# Patient Record
Sex: MALE | Race: WHITE | NOT HISPANIC OR LATINO
[De-identification: names, ages, dates, MRNs, and addresses within clinical notes are randomized per-mention and may not be internally consistent; named-entity substitution may affect disease eponyms.]

---

## 2022-01-03 ENCOUNTER — NON-APPOINTMENT (OUTPATIENT)
Age: 77
End: 2022-01-03

## 2022-01-03 ENCOUNTER — OUTPATIENT (OUTPATIENT)
Dept: OUTPATIENT SERVICES | Facility: HOSPITAL | Age: 77
LOS: 1 days | End: 2022-01-03

## 2022-01-03 DIAGNOSIS — Z11.52 ENCOUNTER FOR SCREENING FOR COVID-19: ICD-10-CM

## 2022-01-03 DIAGNOSIS — Z90.89 ACQUIRED ABSENCE OF OTHER ORGANS: Chronic | ICD-10-CM

## 2022-01-03 LAB — SARS-COV-2 RNA SPEC QL NAA+PROBE: SIGNIFICANT CHANGE UP

## 2022-01-05 ENCOUNTER — INPATIENT (INPATIENT)
Facility: HOSPITAL | Age: 77
LOS: 5 days | Discharge: HOME | DRG: 233 | End: 2022-01-11
Attending: THORACIC SURGERY (CARDIOTHORACIC VASCULAR SURGERY) | Admitting: INTERNAL MEDICINE
Payer: MEDICARE

## 2022-01-05 VITALS
RESPIRATION RATE: 16 BRPM | HEART RATE: 110 BPM | DIASTOLIC BLOOD PRESSURE: 81 MMHG | WEIGHT: 199.96 LBS | TEMPERATURE: 98 F | OXYGEN SATURATION: 98 % | SYSTOLIC BLOOD PRESSURE: 155 MMHG | HEIGHT: 72 IN

## 2022-01-05 DIAGNOSIS — R94.39 ABNORMAL RESULT OF OTHER CARDIOVASCULAR FUNCTION STUDY: ICD-10-CM

## 2022-01-05 DIAGNOSIS — Z90.89 ACQUIRED ABSENCE OF OTHER ORGANS: Chronic | ICD-10-CM

## 2022-01-05 LAB
A1C WITH ESTIMATED AVERAGE GLUCOSE RESULT: 6.5 % — HIGH (ref 4–5.6)
ALBUMIN SERPL ELPH-MCNC: 4.1 G/DL — SIGNIFICANT CHANGE UP (ref 3.3–5)
ALP SERPL-CCNC: 61 U/L — SIGNIFICANT CHANGE UP (ref 40–120)
ALT FLD-CCNC: 26 U/L — SIGNIFICANT CHANGE UP (ref 10–45)
ANION GAP SERPL CALC-SCNC: 13 MMOL/L — SIGNIFICANT CHANGE UP (ref 5–17)
ANION GAP SERPL CALC-SCNC: 13 MMOL/L — SIGNIFICANT CHANGE UP (ref 5–17)
APPEARANCE UR: CLEAR — SIGNIFICANT CHANGE UP
APTT BLD: 32.4 SEC — SIGNIFICANT CHANGE UP (ref 27.5–35.5)
AST SERPL-CCNC: 20 U/L — SIGNIFICANT CHANGE UP (ref 10–40)
BACTERIA # UR AUTO: NEGATIVE — SIGNIFICANT CHANGE UP
BASOPHILS # BLD AUTO: 0.02 K/UL — SIGNIFICANT CHANGE UP (ref 0–0.2)
BASOPHILS NFR BLD AUTO: 0.3 % — SIGNIFICANT CHANGE UP (ref 0–2)
BILIRUB SERPL-MCNC: 0.6 MG/DL — SIGNIFICANT CHANGE UP (ref 0.2–1.2)
BILIRUB UR-MCNC: NEGATIVE — SIGNIFICANT CHANGE UP
BLD GP AB SCN SERPL QL: NEGATIVE — SIGNIFICANT CHANGE UP
BUN SERPL-MCNC: 36 MG/DL — HIGH (ref 7–23)
BUN SERPL-MCNC: 39 MG/DL — HIGH (ref 7–23)
CALCIUM SERPL-MCNC: 10.5 MG/DL — SIGNIFICANT CHANGE UP (ref 8.4–10.5)
CALCIUM SERPL-MCNC: 9.8 MG/DL — SIGNIFICANT CHANGE UP (ref 8.4–10.5)
CHLORIDE SERPL-SCNC: 103 MMOL/L — SIGNIFICANT CHANGE UP (ref 96–108)
CHLORIDE SERPL-SCNC: 104 MMOL/L — SIGNIFICANT CHANGE UP (ref 96–108)
CO2 SERPL-SCNC: 22 MMOL/L — SIGNIFICANT CHANGE UP (ref 22–31)
CO2 SERPL-SCNC: 25 MMOL/L — SIGNIFICANT CHANGE UP (ref 22–31)
COLOR SPEC: SIGNIFICANT CHANGE UP
CREAT SERPL-MCNC: 1.23 MG/DL — SIGNIFICANT CHANGE UP (ref 0.5–1.3)
CREAT SERPL-MCNC: 1.38 MG/DL — HIGH (ref 0.5–1.3)
DIFF PNL FLD: NEGATIVE — SIGNIFICANT CHANGE UP
EOSINOPHIL # BLD AUTO: 0.04 K/UL — SIGNIFICANT CHANGE UP (ref 0–0.5)
EOSINOPHIL NFR BLD AUTO: 0.5 % — SIGNIFICANT CHANGE UP (ref 0–6)
ESTIMATED AVERAGE GLUCOSE: 140 MG/DL — HIGH (ref 68–114)
GLUCOSE SERPL-MCNC: 163 MG/DL — HIGH (ref 70–99)
GLUCOSE SERPL-MCNC: 182 MG/DL — HIGH (ref 70–99)
GLUCOSE UR QL: ABNORMAL
HCT VFR BLD CALC: 38.5 % — LOW (ref 39–50)
HCT VFR BLD CALC: 43.5 % — SIGNIFICANT CHANGE UP (ref 39–50)
HGB BLD-MCNC: 12.5 G/DL — LOW (ref 13–17)
HGB BLD-MCNC: 14.1 G/DL — SIGNIFICANT CHANGE UP (ref 13–17)
IMM GRANULOCYTES NFR BLD AUTO: 0.4 % — SIGNIFICANT CHANGE UP (ref 0–1.5)
INR BLD: 1.04 RATIO — SIGNIFICANT CHANGE UP (ref 0.88–1.16)
KETONES UR-MCNC: NEGATIVE — SIGNIFICANT CHANGE UP
LEUKOCYTE ESTERASE UR-ACNC: ABNORMAL
LYMPHOCYTES # BLD AUTO: 1.08 K/UL — SIGNIFICANT CHANGE UP (ref 1–3.3)
LYMPHOCYTES # BLD AUTO: 14.5 % — SIGNIFICANT CHANGE UP (ref 13–44)
MCHC RBC-ENTMCNC: 27.7 PG — SIGNIFICANT CHANGE UP (ref 27–34)
MCHC RBC-ENTMCNC: 28.3 PG — SIGNIFICANT CHANGE UP (ref 27–34)
MCHC RBC-ENTMCNC: 32.4 GM/DL — SIGNIFICANT CHANGE UP (ref 32–36)
MCHC RBC-ENTMCNC: 32.5 GM/DL — SIGNIFICANT CHANGE UP (ref 32–36)
MCV RBC AUTO: 85.4 FL — SIGNIFICANT CHANGE UP (ref 80–100)
MCV RBC AUTO: 87.2 FL — SIGNIFICANT CHANGE UP (ref 80–100)
MONOCYTES # BLD AUTO: 0.46 K/UL — SIGNIFICANT CHANGE UP (ref 0–0.9)
MONOCYTES NFR BLD AUTO: 6.2 % — SIGNIFICANT CHANGE UP (ref 2–14)
NEUTROPHILS # BLD AUTO: 5.83 K/UL — SIGNIFICANT CHANGE UP (ref 1.8–7.4)
NEUTROPHILS NFR BLD AUTO: 78.1 % — HIGH (ref 43–77)
NITRITE UR-MCNC: NEGATIVE — SIGNIFICANT CHANGE UP
NRBC # BLD: 0 /100 WBCS — SIGNIFICANT CHANGE UP (ref 0–0)
NRBC # BLD: 0 /100 WBCS — SIGNIFICANT CHANGE UP (ref 0–0)
NT-PROBNP SERPL-SCNC: 19 PG/ML — SIGNIFICANT CHANGE UP (ref 0–300)
PA ADP PRP-ACNC: 229 PRU — SIGNIFICANT CHANGE UP (ref 194–417)
PH UR: 6 — SIGNIFICANT CHANGE UP (ref 5–8)
PLATELET # BLD AUTO: 255 K/UL — SIGNIFICANT CHANGE UP (ref 150–400)
PLATELET # BLD AUTO: 295 K/UL — SIGNIFICANT CHANGE UP (ref 150–400)
POTASSIUM SERPL-MCNC: 4.4 MMOL/L — SIGNIFICANT CHANGE UP (ref 3.5–5.3)
POTASSIUM SERPL-MCNC: 4.6 MMOL/L — SIGNIFICANT CHANGE UP (ref 3.5–5.3)
POTASSIUM SERPL-SCNC: 4.4 MMOL/L — SIGNIFICANT CHANGE UP (ref 3.5–5.3)
POTASSIUM SERPL-SCNC: 4.6 MMOL/L — SIGNIFICANT CHANGE UP (ref 3.5–5.3)
PREALB SERPL-MCNC: 26 MG/DL — SIGNIFICANT CHANGE UP (ref 20–40)
PROT SERPL-MCNC: 7 G/DL — SIGNIFICANT CHANGE UP (ref 6–8.3)
PROT UR-MCNC: NEGATIVE — SIGNIFICANT CHANGE UP
PROTHROM AB SERPL-ACNC: 12.5 SEC — SIGNIFICANT CHANGE UP (ref 10.6–13.6)
RBC # BLD: 4.51 M/UL — SIGNIFICANT CHANGE UP (ref 4.2–5.8)
RBC # BLD: 4.99 M/UL — SIGNIFICANT CHANGE UP (ref 4.2–5.8)
RBC # FLD: 15.2 % — HIGH (ref 10.3–14.5)
RBC # FLD: 15.3 % — HIGH (ref 10.3–14.5)
RBC CASTS # UR COMP ASSIST: 1 /HPF — SIGNIFICANT CHANGE UP (ref 0–4)
RH IG SCN BLD-IMP: NEGATIVE — SIGNIFICANT CHANGE UP
RH IG SCN BLD-IMP: NEGATIVE — SIGNIFICANT CHANGE UP
SARS-COV-2 RNA SPEC QL NAA+PROBE: SIGNIFICANT CHANGE UP
SODIUM SERPL-SCNC: 139 MMOL/L — SIGNIFICANT CHANGE UP (ref 135–145)
SODIUM SERPL-SCNC: 141 MMOL/L — SIGNIFICANT CHANGE UP (ref 135–145)
SP GR SPEC: 1.03 — HIGH (ref 1.01–1.02)
TSH SERPL-MCNC: 0.85 UIU/ML — SIGNIFICANT CHANGE UP (ref 0.27–4.2)
UROBILINOGEN FLD QL: NEGATIVE — SIGNIFICANT CHANGE UP
WBC # BLD: 7.3 K/UL — SIGNIFICANT CHANGE UP (ref 3.8–10.5)
WBC # BLD: 7.46 K/UL — SIGNIFICANT CHANGE UP (ref 3.8–10.5)
WBC # FLD AUTO: 7.3 K/UL — SIGNIFICANT CHANGE UP (ref 3.8–10.5)
WBC # FLD AUTO: 7.46 K/UL — SIGNIFICANT CHANGE UP (ref 3.8–10.5)
WBC UR QL: 2 /HPF — SIGNIFICANT CHANGE UP (ref 0–5)

## 2022-01-05 PROCEDURE — 93010 ELECTROCARDIOGRAM REPORT: CPT

## 2022-01-05 PROCEDURE — 99221 1ST HOSP IP/OBS SF/LOW 40: CPT | Mod: 25

## 2022-01-05 PROCEDURE — 71045 X-RAY EXAM CHEST 1 VIEW: CPT | Mod: 26

## 2022-01-05 PROCEDURE — 93306 TTE W/DOPPLER COMPLETE: CPT | Mod: 26

## 2022-01-05 RX ORDER — CHLORHEXIDINE GLUCONATE 213 G/1000ML
1 SOLUTION TOPICAL ONCE
Refills: 0 | Status: COMPLETED | OUTPATIENT
Start: 2022-01-05 | End: 2022-01-05

## 2022-01-05 RX ORDER — CHLORHEXIDINE GLUCONATE 213 G/1000ML
10 SOLUTION TOPICAL ONCE
Refills: 0 | Status: COMPLETED | OUTPATIENT
Start: 2022-01-05 | End: 2022-01-06

## 2022-01-05 RX ORDER — SODIUM CHLORIDE 9 MG/ML
250 INJECTION INTRAMUSCULAR; INTRAVENOUS; SUBCUTANEOUS ONCE
Refills: 0 | Status: DISCONTINUED | OUTPATIENT
Start: 2022-01-05 | End: 2022-01-05

## 2022-01-05 RX ORDER — MUPIROCIN 20 MG/G
1 OINTMENT TOPICAL EVERY 12 HOURS
Refills: 0 | Status: DISCONTINUED | OUTPATIENT
Start: 2022-01-05 | End: 2022-01-06

## 2022-01-05 RX ORDER — HEPARIN SODIUM 5000 [USP'U]/ML
1000 INJECTION INTRAVENOUS; SUBCUTANEOUS
Qty: 25000 | Refills: 0 | Status: DISCONTINUED | OUTPATIENT
Start: 2022-01-05 | End: 2022-01-06

## 2022-01-05 RX ORDER — CEFUROXIME AXETIL 250 MG
1500 TABLET ORAL ONCE
Refills: 0 | Status: COMPLETED | OUTPATIENT
Start: 2022-01-05 | End: 2022-01-06

## 2022-01-05 RX ORDER — PANTOPRAZOLE SODIUM 20 MG/1
40 TABLET, DELAYED RELEASE ORAL
Refills: 0 | Status: DISCONTINUED | OUTPATIENT
Start: 2022-01-05 | End: 2022-01-06

## 2022-01-05 RX ORDER — METOPROLOL TARTRATE 50 MG
25 TABLET ORAL EVERY 8 HOURS
Refills: 0 | Status: DISCONTINUED | OUTPATIENT
Start: 2022-01-05 | End: 2022-01-06

## 2022-01-05 RX ORDER — METOPROLOL TARTRATE 50 MG
25 TABLET ORAL
Refills: 0 | Status: DISCONTINUED | OUTPATIENT
Start: 2022-01-05 | End: 2022-01-05

## 2022-01-05 RX ORDER — ATORVASTATIN CALCIUM 80 MG/1
80 TABLET, FILM COATED ORAL AT BEDTIME
Refills: 0 | Status: DISCONTINUED | OUTPATIENT
Start: 2022-01-05 | End: 2022-01-06

## 2022-01-05 RX ORDER — SODIUM CHLORIDE 9 MG/ML
3 INJECTION INTRAMUSCULAR; INTRAVENOUS; SUBCUTANEOUS EVERY 8 HOURS
Refills: 0 | Status: DISCONTINUED | OUTPATIENT
Start: 2022-01-05 | End: 2022-01-06

## 2022-01-05 RX ADMIN — HEPARIN SODIUM 10 UNIT(S)/HR: 5000 INJECTION INTRAVENOUS; SUBCUTANEOUS at 18:16

## 2022-01-05 RX ADMIN — MUPIROCIN 1 APPLICATION(S): 20 OINTMENT TOPICAL at 17:44

## 2022-01-05 RX ADMIN — SODIUM CHLORIDE 3 MILLILITER(S): 9 INJECTION INTRAMUSCULAR; INTRAVENOUS; SUBCUTANEOUS at 22:04

## 2022-01-05 RX ADMIN — PANTOPRAZOLE SODIUM 40 MILLIGRAM(S): 20 TABLET, DELAYED RELEASE ORAL at 16:18

## 2022-01-05 RX ADMIN — SODIUM CHLORIDE 3 MILLILITER(S): 9 INJECTION INTRAMUSCULAR; INTRAVENOUS; SUBCUTANEOUS at 14:00

## 2022-01-05 RX ADMIN — CHLORHEXIDINE GLUCONATE 1 APPLICATION(S): 213 SOLUTION TOPICAL at 22:24

## 2022-01-05 RX ADMIN — Medication 25 MILLIGRAM(S): at 16:18

## 2022-01-05 RX ADMIN — Medication 25 MILLIGRAM(S): at 22:19

## 2022-01-05 NOTE — CONSULT NOTE ADULT - ASSESSMENT
a/p  76 yr old male with PMH of HTN, HLD, Type 2 DM, CAD, former smoker presented to Dr. Augusto Cordero for follow up and underwent stress test which showed inferior, inferolateral, and septal ischemia, EF 60%. Patient presents today for further ischemic workup.    #Abnormal stress  -s/p LHC with distal LM 99%, mid RCA 99%--IABP inserted  -Ct sx eval w Dr. Mulligan    -AC per ctsx     #HTN   -start low dose bb    #ROSELIA  -cont to monitor  -hold outpt arb/hctz    dvt ppx  a/p  76 yr old male with PMH of HTN, HLD, Type 2 DM, CAD, former smoker presented to Dr. Augusto Cordero for follow up and underwent stress test which showed inferior, inferolateral, and septal ischemia, EF 60%. Patient presents today for further ischemic workup.    #Abnormal stress/ L Main Disease   -s/p LHC with distal LM 99%, mid RCA 99%--IABP inserted  -Ct sx eval w Dr. Mulligan    -AC per ctsx     #HTN   -start low dose bb    #ROSELIA  -cont to monitor  -hold outpt arb/hctz    dvt ppx  a/p  76 yr old male with PMH of HTN, HLD, Type 2 DM, CAD, former smoker presented to Dr. Augusto Cordero for follow up and underwent stress test which showed inferior, inferolateral, and septal ischemia, EF 60%. Patient presents today for further ischemic workup.    #Abnormal stress/ L Main Disease   -s/p LHC with distal LM 99%, mid RCA 99%--IABP inserted  -Ct sx eval w Dr. Mulligan for bypass  -AC per ctsx     #HTN   -start low dose bb    #ROSELIA  -cont to monitor  -hold outpt arb/hctz    dvt ppx  a/p  76 yr old male with PMH of HTN, HLD, Type 2 DM, CAD, former smoker presented to Dr. Augusto Cordero for follow up and underwent stress test which showed inferior, inferolateral, and septal ischemia, EF 60%. Patient presents today for further ischemic workup.    #CAD  -recent outpt abnl stress   -s/p LHC with distal LM 99%, mid RCA 99%--IABP inserted  -Ct sx eval w Dr. Mulligan for bypass  -AC per ctsx     #HTN   -start low dose bb    #ROSELIA  -cont to monitor  -hold outpt arb/hctz    dvt ppx

## 2022-01-05 NOTE — H&P CARDIOLOGY - NSICDXPASTMEDICALHX_GEN_ALL_CORE_FT
PAST MEDICAL HISTORY:  CAD (coronary artery disease)     Diabetes     HTN (hypertension)     Hyperlipidemia

## 2022-01-05 NOTE — CONSULT NOTE ADULT - ASSESSMENT
a/p  76 yr old male with PMH of HTN, HLD, Type 2 DM, CAD, former smoker presented to Dr. Augusto Cordero for follow up and underwent stress test which showed inferior, inferolateral, and septal ischemia, EF 60%. Patient presents today for further ischemic workup.  LHC showed Left main disease, now consulted for CT Surgery evaluation    #CAD  -recent outpt abnl stress   -s/p LHC with distal LM 99%, mid RCA 99%--IABP inserted  -Ct sx eval w Dr. Mulligan for bypass  -Plan for OR tomorrow with IABP, with Dr. Mulligan    #HTN   -start low dose bb    #ROSELIA  -cont to monitor  -hold outpt arb/hctz    dvt ppx

## 2022-01-05 NOTE — CONSULT NOTE ADULT - TIME BILLING
Pt seen and examined, agree with the above assessment and plan by BROWN Guillen.  Pt s/p cardiac cath today for recent abnl outpt exercise stress echo  Cath revealing severe distal left main and prox RCA disease  Pt s/p IABP placement for support  Scheduled for CABG tomorrow w Dr veloz  Continue current excellent care per CTU

## 2022-01-05 NOTE — ASU PATIENT PROFILE, ADULT - FALL HARM RISK - UNIVERSAL INTERVENTIONS
Bed in lowest position, wheels locked, appropriate side rails in place/Call bell, personal items and telephone in reach/Instruct patient to call for assistance before getting out of bed or chair/Non-slip footwear when patient is out of bed/[place] to call system/Physically safe environment - no spills, clutter or unnecessary equipment/Purposeful Proactive Rounding/Room/bathroom lighting operational, light cord in reach

## 2022-01-06 ENCOUNTER — APPOINTMENT (OUTPATIENT)
Dept: CARDIOTHORACIC SURGERY | Facility: CLINIC | Age: 77
End: 2022-01-06

## 2022-01-06 PROBLEM — I25.10 ATHEROSCLEROTIC HEART DISEASE OF NATIVE CORONARY ARTERY WITHOUT ANGINA PECTORIS: Chronic | Status: ACTIVE | Noted: 2022-01-05

## 2022-01-06 LAB
ALBUMIN SERPL ELPH-MCNC: 3.5 G/DL — SIGNIFICANT CHANGE UP (ref 3.3–5)
ALBUMIN SERPL ELPH-MCNC: 3.9 G/DL — SIGNIFICANT CHANGE UP (ref 3.3–5)
ALP SERPL-CCNC: 46 U/L — SIGNIFICANT CHANGE UP (ref 40–120)
ALP SERPL-CCNC: 61 U/L — SIGNIFICANT CHANGE UP (ref 40–120)
ALT FLD-CCNC: 23 U/L — SIGNIFICANT CHANGE UP (ref 10–45)
ALT FLD-CCNC: 25 U/L — SIGNIFICANT CHANGE UP (ref 10–45)
ANION GAP SERPL CALC-SCNC: 13 MMOL/L — SIGNIFICANT CHANGE UP (ref 5–17)
ANION GAP SERPL CALC-SCNC: 14 MMOL/L — SIGNIFICANT CHANGE UP (ref 5–17)
APTT BLD: 29.1 SEC — SIGNIFICANT CHANGE UP (ref 27.5–35.5)
APTT BLD: 40.2 SEC — HIGH (ref 27.5–35.5)
APTT BLD: >200 SEC — CRITICAL HIGH (ref 27.5–35.5)
AST SERPL-CCNC: 16 U/L — SIGNIFICANT CHANGE UP (ref 10–40)
AST SERPL-CCNC: 33 U/L — SIGNIFICANT CHANGE UP (ref 10–40)
BASE EXCESS BLDV CALC-SCNC: -0.6 MMOL/L — SIGNIFICANT CHANGE UP (ref -2–2)
BASE EXCESS BLDV CALC-SCNC: -1 MMOL/L — SIGNIFICANT CHANGE UP (ref -2–2)
BASE EXCESS BLDV CALC-SCNC: -1 MMOL/L — SIGNIFICANT CHANGE UP (ref -2–2)
BASE EXCESS BLDV CALC-SCNC: -1.2 MMOL/L — SIGNIFICANT CHANGE UP (ref -2–2)
BASOPHILS # BLD AUTO: 0.03 K/UL — SIGNIFICANT CHANGE UP (ref 0–0.2)
BASOPHILS # BLD AUTO: 0.04 K/UL — SIGNIFICANT CHANGE UP (ref 0–0.2)
BASOPHILS NFR BLD AUTO: 0.2 % — SIGNIFICANT CHANGE UP (ref 0–2)
BASOPHILS NFR BLD AUTO: 0.5 % — SIGNIFICANT CHANGE UP (ref 0–2)
BILIRUB SERPL-MCNC: 0.6 MG/DL — SIGNIFICANT CHANGE UP (ref 0.2–1.2)
BILIRUB SERPL-MCNC: 1.1 MG/DL — SIGNIFICANT CHANGE UP (ref 0.2–1.2)
BLOOD GAS VENOUS - CREATININE: SIGNIFICANT CHANGE UP MG/DL (ref 0.5–1.3)
BUN SERPL-MCNC: 24 MG/DL — HIGH (ref 7–23)
BUN SERPL-MCNC: 32 MG/DL — HIGH (ref 7–23)
CA-I SERPL-SCNC: 0.98 MMOL/L — LOW (ref 1.15–1.33)
CA-I SERPL-SCNC: 1.01 MMOL/L — LOW (ref 1.15–1.33)
CA-I SERPL-SCNC: 1.01 MMOL/L — LOW (ref 1.15–1.33)
CA-I SERPL-SCNC: 1.05 MMOL/L — LOW (ref 1.15–1.33)
CALCIUM SERPL-MCNC: 8.4 MG/DL — SIGNIFICANT CHANGE UP (ref 8.4–10.5)
CALCIUM SERPL-MCNC: 9.3 MG/DL — SIGNIFICANT CHANGE UP (ref 8.4–10.5)
CHLORIDE BLDV-SCNC: 102 MMOL/L — SIGNIFICANT CHANGE UP (ref 96–108)
CHLORIDE BLDV-SCNC: 102 MMOL/L — SIGNIFICANT CHANGE UP (ref 96–108)
CHLORIDE BLDV-SCNC: 103 MMOL/L — SIGNIFICANT CHANGE UP (ref 96–108)
CHLORIDE BLDV-SCNC: 103 MMOL/L — SIGNIFICANT CHANGE UP (ref 96–108)
CHLORIDE SERPL-SCNC: 100 MMOL/L — SIGNIFICANT CHANGE UP (ref 96–108)
CHLORIDE SERPL-SCNC: 102 MMOL/L — SIGNIFICANT CHANGE UP (ref 96–108)
CK MB BLD-MCNC: 5.8 % — HIGH (ref 0–3.5)
CK MB CFR SERPL CALC: 16.8 NG/ML — HIGH (ref 0–6.7)
CK SERPL-CCNC: 290 U/L — HIGH (ref 30–200)
CO2 BLDV-SCNC: 26 MMOL/L — SIGNIFICANT CHANGE UP (ref 22–26)
CO2 BLDV-SCNC: 27 MMOL/L — HIGH (ref 22–26)
CO2 SERPL-SCNC: 20 MMOL/L — LOW (ref 22–31)
CO2 SERPL-SCNC: 22 MMOL/L — SIGNIFICANT CHANGE UP (ref 22–31)
CREAT SERPL-MCNC: 1.1 MG/DL — SIGNIFICANT CHANGE UP (ref 0.5–1.3)
CREAT SERPL-MCNC: 1.12 MG/DL — SIGNIFICANT CHANGE UP (ref 0.5–1.3)
EOSINOPHIL # BLD AUTO: 0.06 K/UL — SIGNIFICANT CHANGE UP (ref 0–0.5)
EOSINOPHIL # BLD AUTO: 0.12 K/UL — SIGNIFICANT CHANGE UP (ref 0–0.5)
EOSINOPHIL NFR BLD AUTO: 0.5 % — SIGNIFICANT CHANGE UP (ref 0–6)
EOSINOPHIL NFR BLD AUTO: 1.5 % — SIGNIFICANT CHANGE UP (ref 0–6)
FIBRINOGEN PPP-MCNC: 381 MG/DL — SIGNIFICANT CHANGE UP (ref 290–520)
FIBRINOGEN PPP-MCNC: 453 MG/DL — SIGNIFICANT CHANGE UP (ref 290–520)
GAS PNL BLDA: SIGNIFICANT CHANGE UP
GAS PNL BLDV: 133 MMOL/L — LOW (ref 136–145)
GAS PNL BLDV: 134 MMOL/L — LOW (ref 136–145)
GAS PNL BLDV: 135 MMOL/L — LOW (ref 136–145)
GAS PNL BLDV: 135 MMOL/L — LOW (ref 136–145)
GAS PNL BLDV: SIGNIFICANT CHANGE UP
GLUCOSE BLDV-MCNC: 152 MG/DL — HIGH (ref 70–99)
GLUCOSE BLDV-MCNC: 168 MG/DL — HIGH (ref 70–99)
GLUCOSE BLDV-MCNC: 193 MG/DL — HIGH (ref 70–99)
GLUCOSE BLDV-MCNC: 198 MG/DL — HIGH (ref 70–99)
GLUCOSE SERPL-MCNC: 189 MG/DL — HIGH (ref 70–99)
GLUCOSE SERPL-MCNC: 242 MG/DL — HIGH (ref 70–99)
HCO3 BLDV-SCNC: 24 MMOL/L — SIGNIFICANT CHANGE UP (ref 22–29)
HCO3 BLDV-SCNC: 24 MMOL/L — SIGNIFICANT CHANGE UP (ref 22–29)
HCO3 BLDV-SCNC: 25 MMOL/L — SIGNIFICANT CHANGE UP (ref 22–29)
HCO3 BLDV-SCNC: 25 MMOL/L — SIGNIFICANT CHANGE UP (ref 22–29)
HCT VFR BLD CALC: 29.1 % — LOW (ref 39–50)
HCT VFR BLD CALC: 37.8 % — LOW (ref 39–50)
HCT VFR BLDA CALC: 25 % — LOW (ref 39–51)
HCT VFR BLDA CALC: 26 % — LOW (ref 39–51)
HCT VFR BLDA CALC: 26 % — LOW (ref 39–51)
HCT VFR BLDA CALC: 27 % — LOW (ref 39–51)
HEPARINASE TEG R TIME: 5.6 MIN — SIGNIFICANT CHANGE UP (ref 4.3–8.3)
HEPARINASE TEG R TIME: 6.2 MIN — SIGNIFICANT CHANGE UP (ref 4.3–8.3)
HGB BLD CALC-MCNC: 8.4 G/DL — LOW (ref 12.6–17.4)
HGB BLD CALC-MCNC: 8.5 G/DL — LOW (ref 12.6–17.4)
HGB BLD CALC-MCNC: 8.6 G/DL — LOW (ref 12.6–17.4)
HGB BLD CALC-MCNC: 9.1 G/DL — LOW (ref 12.6–17.4)
HGB BLD-MCNC: 12.3 G/DL — LOW (ref 13–17)
HGB BLD-MCNC: 9.6 G/DL — LOW (ref 13–17)
IMM GRANULOCYTES NFR BLD AUTO: 0.2 % — SIGNIFICANT CHANGE UP (ref 0–1.5)
IMM GRANULOCYTES NFR BLD AUTO: 0.7 % — SIGNIFICANT CHANGE UP (ref 0–1.5)
INR BLD: 1.1 RATIO — SIGNIFICANT CHANGE UP (ref 0.88–1.16)
INR BLD: 1.14 RATIO — SIGNIFICANT CHANGE UP (ref 0.88–1.16)
LACTATE BLDV-MCNC: 1.3 MMOL/L — SIGNIFICANT CHANGE UP (ref 0.7–2)
LACTATE BLDV-MCNC: 1.6 MMOL/L — SIGNIFICANT CHANGE UP (ref 0.7–2)
LACTATE BLDV-MCNC: 1.7 MMOL/L — SIGNIFICANT CHANGE UP (ref 0.7–2)
LACTATE BLDV-MCNC: 1.8 MMOL/L — SIGNIFICANT CHANGE UP (ref 0.7–2)
LYMPHOCYTES # BLD AUTO: 0.78 K/UL — LOW (ref 1–3.3)
LYMPHOCYTES # BLD AUTO: 1.32 K/UL — SIGNIFICANT CHANGE UP (ref 1–3.3)
LYMPHOCYTES # BLD AUTO: 16 % — SIGNIFICANT CHANGE UP (ref 13–44)
LYMPHOCYTES # BLD AUTO: 6.2 % — LOW (ref 13–44)
MAGNESIUM SERPL-MCNC: 1.8 MG/DL — SIGNIFICANT CHANGE UP (ref 1.6–2.6)
MAGNESIUM SERPL-MCNC: 2.6 MG/DL — SIGNIFICANT CHANGE UP (ref 1.6–2.6)
MCHC RBC-ENTMCNC: 28 PG — SIGNIFICANT CHANGE UP (ref 27–34)
MCHC RBC-ENTMCNC: 28.7 PG — SIGNIFICANT CHANGE UP (ref 27–34)
MCHC RBC-ENTMCNC: 32.5 GM/DL — SIGNIFICANT CHANGE UP (ref 32–36)
MCHC RBC-ENTMCNC: 33 GM/DL — SIGNIFICANT CHANGE UP (ref 32–36)
MCV RBC AUTO: 85.9 FL — SIGNIFICANT CHANGE UP (ref 80–100)
MCV RBC AUTO: 86.9 FL — SIGNIFICANT CHANGE UP (ref 80–100)
MONOCYTES # BLD AUTO: 0.59 K/UL — SIGNIFICANT CHANGE UP (ref 0–0.9)
MONOCYTES # BLD AUTO: 0.61 K/UL — SIGNIFICANT CHANGE UP (ref 0–0.9)
MONOCYTES NFR BLD AUTO: 4.7 % — SIGNIFICANT CHANGE UP (ref 2–14)
MONOCYTES NFR BLD AUTO: 7.4 % — SIGNIFICANT CHANGE UP (ref 2–14)
MRSA PCR RESULT.: SIGNIFICANT CHANGE UP
NEUTROPHILS # BLD AUTO: 10.97 K/UL — HIGH (ref 1.8–7.4)
NEUTROPHILS # BLD AUTO: 6.12 K/UL — SIGNIFICANT CHANGE UP (ref 1.8–7.4)
NEUTROPHILS NFR BLD AUTO: 74.4 % — SIGNIFICANT CHANGE UP (ref 43–77)
NEUTROPHILS NFR BLD AUTO: 87.7 % — HIGH (ref 43–77)
NRBC # BLD: 0 /100 WBCS — SIGNIFICANT CHANGE UP (ref 0–0)
NRBC # BLD: 0 /100 WBCS — SIGNIFICANT CHANGE UP (ref 0–0)
PCO2 BLDV: 42 MMHG — SIGNIFICANT CHANGE UP (ref 42–55)
PCO2 BLDV: 43 MMHG — SIGNIFICANT CHANGE UP (ref 42–55)
PCO2 BLDV: 44 MMHG — SIGNIFICANT CHANGE UP (ref 42–55)
PCO2 BLDV: 49 MMHG — SIGNIFICANT CHANGE UP (ref 42–55)
PH BLDV: 7.32 — SIGNIFICANT CHANGE UP (ref 7.32–7.43)
PH BLDV: 7.36 — SIGNIFICANT CHANGE UP (ref 7.32–7.43)
PH BLDV: 7.36 — SIGNIFICANT CHANGE UP (ref 7.32–7.43)
PH BLDV: 7.37 — SIGNIFICANT CHANGE UP (ref 7.32–7.43)
PHOSPHATE SERPL-MCNC: 2.2 MG/DL — LOW (ref 2.5–4.5)
PHOSPHATE SERPL-MCNC: 2.6 MG/DL — SIGNIFICANT CHANGE UP (ref 2.5–4.5)
PLATELET # BLD AUTO: 240 K/UL — SIGNIFICANT CHANGE UP (ref 150–400)
PLATELET # BLD AUTO: 248 K/UL — SIGNIFICANT CHANGE UP (ref 150–400)
PO2 BLDV: 136 MMHG — HIGH (ref 25–45)
PO2 BLDV: 162 MMHG — HIGH (ref 25–45)
PO2 BLDV: 58 MMHG — HIGH (ref 25–45)
PO2 BLDV: 62 MMHG — HIGH (ref 25–45)
POTASSIUM BLDV-SCNC: 3.9 MMOL/L — SIGNIFICANT CHANGE UP (ref 3.5–5.1)
POTASSIUM BLDV-SCNC: 4.4 MMOL/L — SIGNIFICANT CHANGE UP (ref 3.5–5.1)
POTASSIUM BLDV-SCNC: 5.3 MMOL/L — HIGH (ref 3.5–5.1)
POTASSIUM BLDV-SCNC: 5.4 MMOL/L — HIGH (ref 3.5–5.1)
POTASSIUM SERPL-MCNC: 3.6 MMOL/L — SIGNIFICANT CHANGE UP (ref 3.5–5.3)
POTASSIUM SERPL-MCNC: 4 MMOL/L — SIGNIFICANT CHANGE UP (ref 3.5–5.3)
POTASSIUM SERPL-SCNC: 3.6 MMOL/L — SIGNIFICANT CHANGE UP (ref 3.5–5.3)
POTASSIUM SERPL-SCNC: 4 MMOL/L — SIGNIFICANT CHANGE UP (ref 3.5–5.3)
PROT SERPL-MCNC: 5.5 G/DL — LOW (ref 6–8.3)
PROT SERPL-MCNC: 6.6 G/DL — SIGNIFICANT CHANGE UP (ref 6–8.3)
PROTHROM AB SERPL-ACNC: 13.1 SEC — SIGNIFICANT CHANGE UP (ref 10.6–13.6)
PROTHROM AB SERPL-ACNC: 13.6 SEC — SIGNIFICANT CHANGE UP (ref 10.6–13.6)
RAPIDTEG MAXIMUM AMPLITUDE: 63.9 MM — SIGNIFICANT CHANGE UP (ref 52–70)
RAPIDTEG MAXIMUM AMPLITUDE: 68.3 MM — SIGNIFICANT CHANGE UP (ref 52–70)
RBC # BLD: 3.35 M/UL — LOW (ref 4.2–5.8)
RBC # BLD: 4.4 M/UL — SIGNIFICANT CHANGE UP (ref 4.2–5.8)
RBC # FLD: 15.1 % — HIGH (ref 10.3–14.5)
RBC # FLD: 15.3 % — HIGH (ref 10.3–14.5)
S AUREUS DNA NOSE QL NAA+PROBE: SIGNIFICANT CHANGE UP
SAO2 % BLDV: 88.4 % — HIGH (ref 67–88)
SAO2 % BLDV: 91.5 % — HIGH (ref 67–88)
SAO2 % BLDV: 97.3 % — HIGH (ref 67–88)
SAO2 % BLDV: 97.6 % — HIGH (ref 67–88)
SODIUM SERPL-SCNC: 135 MMOL/L — SIGNIFICANT CHANGE UP (ref 135–145)
SODIUM SERPL-SCNC: 136 MMOL/L — SIGNIFICANT CHANGE UP (ref 135–145)
TEG FUNCTIONAL FIBRINOGEN: 22.9 MM — SIGNIFICANT CHANGE UP (ref 15–32)
TEG FUNCTIONAL FIBRINOGEN: 29.1 MM — SIGNIFICANT CHANGE UP (ref 15–32)
TEG MAXIMUM AMPLITUDE: 62.8 MM — SIGNIFICANT CHANGE UP (ref 52–69)
TEG MAXIMUM AMPLITUDE: 64.7 MM — SIGNIFICANT CHANGE UP (ref 52–69)
TEG REACTION TIME: 11 MIN (ref 4.6–9.1)
TEG REACTION TIME: 6.1 MIN — SIGNIFICANT CHANGE UP (ref 4.6–9.1)
TROPONIN T, HIGH SENSITIVITY RESULT: 223 NG/L — HIGH (ref 0–51)
WBC # BLD: 12.61 K/UL — HIGH (ref 3.8–10.5)
WBC # BLD: 8.23 K/UL — SIGNIFICANT CHANGE UP (ref 3.8–10.5)
WBC # FLD AUTO: 12.61 K/UL — HIGH (ref 3.8–10.5)
WBC # FLD AUTO: 8.23 K/UL — SIGNIFICANT CHANGE UP (ref 3.8–10.5)

## 2022-01-06 PROCEDURE — 99291 CRITICAL CARE FIRST HOUR: CPT

## 2022-01-06 PROCEDURE — 33518 CABG ARTERY-VEIN TWO: CPT

## 2022-01-06 PROCEDURE — 71045 X-RAY EXAM CHEST 1 VIEW: CPT | Mod: 26

## 2022-01-06 PROCEDURE — 33533 CABG ARTERIAL SINGLE: CPT

## 2022-01-06 PROCEDURE — 33508 ENDOSCOPIC VEIN HARVEST: CPT | Mod: 59

## 2022-01-06 PROCEDURE — 93010 ELECTROCARDIOGRAM REPORT: CPT | Mod: 77

## 2022-01-06 PROCEDURE — 93010 ELECTROCARDIOGRAM REPORT: CPT

## 2022-01-06 DEVICE — MEDIASTINAL CATH DRAIN 9MM: Type: IMPLANTABLE DEVICE | Status: FUNCTIONAL

## 2022-01-06 DEVICE — KIT CVC 2LUM MAC 9FR CHG: Type: IMPLANTABLE DEVICE | Status: FUNCTIONAL

## 2022-01-06 DEVICE — CANNULA RCSP 15FR X 12".5" MANUAL-INFLATE CUFF WITH GUIDEWIRE STYLET: Type: IMPLANTABLE DEVICE | Status: FUNCTIONAL

## 2022-01-06 DEVICE — LIGATING CLIPS WECK HORIZON MEDIUM (BLUE) 24: Type: IMPLANTABLE DEVICE | Status: FUNCTIONAL

## 2022-01-06 DEVICE — KIT A-LINE 1LUM 20G X 12CM SAFE KIT: Type: IMPLANTABLE DEVICE | Status: FUNCTIONAL

## 2022-01-06 DEVICE — CANNULA AORTIC PERFUSION EZ GLIDE STRAIGHT 21FR X 35CM NON-VENTED: Type: IMPLANTABLE DEVICE | Status: FUNCTIONAL

## 2022-01-06 DEVICE — CANNULA VENOUS 2 STAGE LIGHTHOUSE TIP 28-38FR X 1/2" NON-VENTED: Type: IMPLANTABLE DEVICE | Status: FUNCTIONAL

## 2022-01-06 DEVICE — SHUNT FLO-THRU INTRALUMINAL1.5MM X 18MM: Type: IMPLANTABLE DEVICE | Status: FUNCTIONAL

## 2022-01-06 DEVICE — SHUNT FLO-THRU INTRALUMINAL 1MM X 18MM: Type: IMPLANTABLE DEVICE | Status: FUNCTIONAL

## 2022-01-06 DEVICE — LIGATING CLIPS WECK HORIZON SMALL-WIDE (RED) 24: Type: IMPLANTABLE DEVICE | Status: FUNCTIONAL

## 2022-01-06 DEVICE — CANNULA AORTIC ROOT 14G X 14CM FLANGED: Type: IMPLANTABLE DEVICE | Status: FUNCTIONAL

## 2022-01-06 RX ORDER — FAMOTIDINE 10 MG/ML
20 INJECTION INTRAVENOUS EVERY 12 HOURS
Refills: 0 | Status: DISCONTINUED | OUTPATIENT
Start: 2022-01-06 | End: 2022-01-06

## 2022-01-06 RX ORDER — ALBUMIN HUMAN 25 %
250 VIAL (ML) INTRAVENOUS
Refills: 0 | Status: COMPLETED | OUTPATIENT
Start: 2022-01-06 | End: 2022-01-06

## 2022-01-06 RX ORDER — ATORVASTATIN CALCIUM 80 MG/1
80 TABLET, FILM COATED ORAL AT BEDTIME
Refills: 0 | Status: DISCONTINUED | OUTPATIENT
Start: 2022-01-06 | End: 2022-01-11

## 2022-01-06 RX ORDER — POTASSIUM CHLORIDE 20 MEQ
10 PACKET (EA) ORAL
Refills: 0 | Status: DISCONTINUED | OUTPATIENT
Start: 2022-01-06 | End: 2022-01-07

## 2022-01-06 RX ORDER — HYDROMORPHONE HYDROCHLORIDE 2 MG/ML
1 INJECTION INTRAMUSCULAR; INTRAVENOUS; SUBCUTANEOUS ONCE
Refills: 0 | Status: DISCONTINUED | OUTPATIENT
Start: 2022-01-06 | End: 2022-01-06

## 2022-01-06 RX ORDER — SODIUM CHLORIDE 9 MG/ML
500 INJECTION, SOLUTION INTRAVENOUS ONCE
Refills: 0 | Status: COMPLETED | OUTPATIENT
Start: 2022-01-06 | End: 2022-01-06

## 2022-01-06 RX ORDER — NOREPINEPHRINE BITARTRATE/D5W 8 MG/250ML
0.03 PLASTIC BAG, INJECTION (ML) INTRAVENOUS
Qty: 8 | Refills: 0 | Status: DISCONTINUED | OUTPATIENT
Start: 2022-01-06 | End: 2022-01-07

## 2022-01-06 RX ORDER — ASCORBIC ACID 60 MG
500 TABLET,CHEWABLE ORAL
Refills: 0 | Status: COMPLETED | OUTPATIENT
Start: 2022-01-06 | End: 2022-01-11

## 2022-01-06 RX ORDER — HYDROMORPHONE HYDROCHLORIDE 2 MG/ML
0.5 INJECTION INTRAMUSCULAR; INTRAVENOUS; SUBCUTANEOUS EVERY 6 HOURS
Refills: 0 | Status: DISCONTINUED | OUTPATIENT
Start: 2022-01-06 | End: 2022-01-06

## 2022-01-06 RX ORDER — POLYETHYLENE GLYCOL 3350 17 G/17G
17 POWDER, FOR SOLUTION ORAL DAILY
Refills: 0 | Status: DISCONTINUED | OUTPATIENT
Start: 2022-01-07 | End: 2022-01-11

## 2022-01-06 RX ORDER — DEXTROSE 50 % IN WATER 50 %
50 SYRINGE (ML) INTRAVENOUS
Refills: 0 | Status: DISCONTINUED | OUTPATIENT
Start: 2022-01-06 | End: 2022-01-11

## 2022-01-06 RX ORDER — ASPIRIN/CALCIUM CARB/MAGNESIUM 324 MG
325 TABLET ORAL DAILY
Refills: 0 | Status: DISCONTINUED | OUTPATIENT
Start: 2022-01-06 | End: 2022-01-11

## 2022-01-06 RX ORDER — INSULIN HUMAN 100 [IU]/ML
3 INJECTION, SOLUTION SUBCUTANEOUS
Qty: 100 | Refills: 0 | Status: DISCONTINUED | OUTPATIENT
Start: 2022-01-06 | End: 2022-01-08

## 2022-01-06 RX ORDER — CEFUROXIME AXETIL 250 MG
1500 TABLET ORAL EVERY 8 HOURS
Refills: 0 | Status: COMPLETED | OUTPATIENT
Start: 2022-01-06 | End: 2022-01-07

## 2022-01-06 RX ORDER — DEXMEDETOMIDINE HYDROCHLORIDE IN 0.9% SODIUM CHLORIDE 4 UG/ML
0.7 INJECTION INTRAVENOUS
Qty: 200 | Refills: 0 | Status: DISCONTINUED | OUTPATIENT
Start: 2022-01-06 | End: 2022-01-06

## 2022-01-06 RX ORDER — SODIUM CHLORIDE 9 MG/ML
1000 INJECTION, SOLUTION INTRAVENOUS ONCE
Refills: 0 | Status: COMPLETED | OUTPATIENT
Start: 2022-01-06 | End: 2022-01-06

## 2022-01-06 RX ORDER — VASOPRESSIN 20 [USP'U]/ML
0.06 INJECTION INTRAVENOUS
Qty: 50 | Refills: 0 | Status: DISCONTINUED | OUTPATIENT
Start: 2022-01-06 | End: 2022-01-07

## 2022-01-06 RX ORDER — OXYCODONE HYDROCHLORIDE 5 MG/1
10 TABLET ORAL EVERY 4 HOURS
Refills: 0 | Status: DISCONTINUED | OUTPATIENT
Start: 2022-01-06 | End: 2022-01-11

## 2022-01-06 RX ORDER — AMIODARONE HYDROCHLORIDE 400 MG/1
400 TABLET ORAL
Refills: 0 | Status: DISCONTINUED | OUTPATIENT
Start: 2022-01-06 | End: 2022-01-06

## 2022-01-06 RX ORDER — CHLORHEXIDINE GLUCONATE 213 G/1000ML
15 SOLUTION TOPICAL EVERY 12 HOURS
Refills: 0 | Status: DISCONTINUED | OUTPATIENT
Start: 2022-01-06 | End: 2022-01-06

## 2022-01-06 RX ORDER — ACETAMINOPHEN 500 MG
1000 TABLET ORAL ONCE
Refills: 0 | Status: COMPLETED | OUTPATIENT
Start: 2022-01-06 | End: 2022-01-06

## 2022-01-06 RX ORDER — ACETAMINOPHEN 500 MG
650 TABLET ORAL EVERY 6 HOURS
Refills: 0 | Status: DISCONTINUED | OUTPATIENT
Start: 2022-01-09 | End: 2022-01-11

## 2022-01-06 RX ORDER — OXYCODONE HYDROCHLORIDE 5 MG/1
5 TABLET ORAL EVERY 4 HOURS
Refills: 0 | Status: DISCONTINUED | OUTPATIENT
Start: 2022-01-06 | End: 2022-01-11

## 2022-01-06 RX ORDER — ACETAMINOPHEN 500 MG
650 TABLET ORAL EVERY 6 HOURS
Refills: 0 | Status: COMPLETED | OUTPATIENT
Start: 2022-01-06 | End: 2022-01-09

## 2022-01-06 RX ORDER — MAGNESIUM SULFATE 500 MG/ML
2 VIAL (ML) INJECTION ONCE
Refills: 0 | Status: COMPLETED | OUTPATIENT
Start: 2022-01-06 | End: 2022-01-06

## 2022-01-06 RX ORDER — CHLORHEXIDINE GLUCONATE 213 G/1000ML
1 SOLUTION TOPICAL DAILY
Refills: 0 | Status: DISCONTINUED | OUTPATIENT
Start: 2022-01-06 | End: 2022-01-09

## 2022-01-06 RX ORDER — POTASSIUM CHLORIDE 20 MEQ
10 PACKET (EA) ORAL
Refills: 0 | Status: COMPLETED | OUTPATIENT
Start: 2022-01-06 | End: 2022-01-06

## 2022-01-06 RX ORDER — DEXTROSE 50 % IN WATER 50 %
25 SYRINGE (ML) INTRAVENOUS
Refills: 0 | Status: DISCONTINUED | OUTPATIENT
Start: 2022-01-06 | End: 2022-01-06

## 2022-01-06 RX ORDER — GABAPENTIN 400 MG/1
100 CAPSULE ORAL EVERY 8 HOURS
Refills: 0 | Status: DISCONTINUED | OUTPATIENT
Start: 2022-01-06 | End: 2022-01-11

## 2022-01-06 RX ORDER — PANTOPRAZOLE SODIUM 20 MG/1
40 TABLET, DELAYED RELEASE ORAL
Refills: 0 | Status: DISCONTINUED | OUTPATIENT
Start: 2022-01-06 | End: 2022-01-11

## 2022-01-06 RX ORDER — SODIUM CHLORIDE 9 MG/ML
1000 INJECTION INTRAMUSCULAR; INTRAVENOUS; SUBCUTANEOUS
Refills: 0 | Status: DISCONTINUED | OUTPATIENT
Start: 2022-01-06 | End: 2022-01-09

## 2022-01-06 RX ORDER — SENNA PLUS 8.6 MG/1
2 TABLET ORAL AT BEDTIME
Refills: 0 | Status: DISCONTINUED | OUTPATIENT
Start: 2022-01-07 | End: 2022-01-11

## 2022-01-06 RX ORDER — METOCLOPRAMIDE HCL 10 MG
10 TABLET ORAL EVERY 8 HOURS
Refills: 0 | Status: DISCONTINUED | OUTPATIENT
Start: 2022-01-06 | End: 2022-01-07

## 2022-01-06 RX ADMIN — DEXMEDETOMIDINE HYDROCHLORIDE IN 0.9% SODIUM CHLORIDE 15.9 MICROGRAM(S)/KG/HR: 4 INJECTION INTRAVENOUS at 14:17

## 2022-01-06 RX ADMIN — Medication 100 MILLIEQUIVALENT(S): at 21:00

## 2022-01-06 RX ADMIN — SODIUM CHLORIDE 3000 MILLILITER(S): 9 INJECTION, SOLUTION INTRAVENOUS at 14:48

## 2022-01-06 RX ADMIN — SODIUM CHLORIDE 10 MILLILITER(S): 9 INJECTION INTRAMUSCULAR; INTRAVENOUS; SUBCUTANEOUS at 14:14

## 2022-01-06 RX ADMIN — VASOPRESSIN 3.6 UNIT(S)/MIN: 20 INJECTION INTRAVENOUS at 14:16

## 2022-01-06 RX ADMIN — SODIUM CHLORIDE 3 MILLILITER(S): 9 INJECTION INTRAMUSCULAR; INTRAVENOUS; SUBCUTANEOUS at 06:53

## 2022-01-06 RX ADMIN — Medication 100 MILLIEQUIVALENT(S): at 16:00

## 2022-01-06 RX ADMIN — Medication 325 MILLIGRAM(S): at 22:59

## 2022-01-06 RX ADMIN — Medication 125 MILLILITER(S): at 17:50

## 2022-01-06 RX ADMIN — Medication 400 MILLIGRAM(S): at 21:55

## 2022-01-06 RX ADMIN — CHLORHEXIDINE GLUCONATE 15 MILLILITER(S): 213 SOLUTION TOPICAL at 19:39

## 2022-01-06 RX ADMIN — Medication 1000 MILLIGRAM(S): at 22:30

## 2022-01-06 RX ADMIN — FAMOTIDINE 20 MILLIGRAM(S): 10 INJECTION INTRAVENOUS at 17:57

## 2022-01-06 RX ADMIN — MUPIROCIN 1 APPLICATION(S): 20 OINTMENT TOPICAL at 06:52

## 2022-01-06 RX ADMIN — Medication 125 MILLILITER(S): at 18:35

## 2022-01-06 RX ADMIN — GABAPENTIN 100 MILLIGRAM(S): 400 CAPSULE ORAL at 22:18

## 2022-01-06 RX ADMIN — CHLORHEXIDINE GLUCONATE 10 MILLILITER(S): 213 SOLUTION TOPICAL at 06:53

## 2022-01-06 RX ADMIN — ATORVASTATIN CALCIUM 80 MILLIGRAM(S): 80 TABLET, FILM COATED ORAL at 22:18

## 2022-01-06 RX ADMIN — Medication 100 MILLIEQUIVALENT(S): at 15:08

## 2022-01-06 RX ADMIN — Medication 100 MILLIEQUIVALENT(S): at 14:30

## 2022-01-06 RX ADMIN — INSULIN HUMAN 3 UNIT(S)/HR: 100 INJECTION, SOLUTION SUBCUTANEOUS at 14:14

## 2022-01-06 RX ADMIN — Medication 25 MILLIGRAM(S): at 06:12

## 2022-01-06 RX ADMIN — Medication 100 MILLIGRAM(S): at 16:01

## 2022-01-06 RX ADMIN — SODIUM CHLORIDE 2000 MILLILITER(S): 9 INJECTION, SOLUTION INTRAVENOUS at 14:21

## 2022-01-06 RX ADMIN — Medication 5.1 MICROGRAM(S)/KG/MIN: at 14:16

## 2022-01-06 RX ADMIN — HYDROMORPHONE HYDROCHLORIDE 1 MILLIGRAM(S): 2 INJECTION INTRAMUSCULAR; INTRAVENOUS; SUBCUTANEOUS at 23:30

## 2022-01-06 RX ADMIN — Medication 100 MILLIEQUIVALENT(S): at 20:15

## 2022-01-06 RX ADMIN — SODIUM CHLORIDE 3000 MILLILITER(S): 9 INJECTION, SOLUTION INTRAVENOUS at 14:44

## 2022-01-06 RX ADMIN — HYDROMORPHONE HYDROCHLORIDE 1 MILLIGRAM(S): 2 INJECTION INTRAMUSCULAR; INTRAVENOUS; SUBCUTANEOUS at 23:45

## 2022-01-06 RX ADMIN — Medication 104 MILLIGRAM(S): at 22:52

## 2022-01-06 NOTE — PRE-ANESTHESIA EVALUATION ADULT - NSDENTALSD_ENT_ALL_CORE
Front loose tooth/loose teeth/missing teeth left maxillary central incisor loose tooth/loose teeth/missing teeth

## 2022-01-06 NOTE — PROGRESS NOTE ADULT - SUBJECTIVE AND OBJECTIVE BOX
CRITICAL CARE ATTENDING - CTICU    MEDICATIONS  (STANDING):  acetaminophen     Tablet .. 650 milliGRAM(s) Oral every 6 hours  aMIOdarone    Tablet 400 milliGRAM(s) Oral two times a day  ascorbic acid 500 milliGRAM(s) Oral two times a day  cefuroxime  IVPB 1500 milliGRAM(s) IV Intermittent every 8 hours  cefuroxime  IVPB 1500 milliGRAM(s) IV Intermittent once  chlorhexidine 0.12% Liquid 15 milliLiter(s) Oral Mucosa every 12 hours  chlorhexidine 2% Cloths 1 Application(s) Topical daily  dexMEDEtomidine Infusion 0.7 MICROgram(s)/kG/Hr (15.9 mL/Hr) IV Continuous <Continuous>  dextrose 50% Injectable 50 milliLiter(s) IV Push every 15 minutes  dextrose 50% Injectable 25 milliLiter(s) IV Push every 15 minutes  famotidine Injectable 20 milliGRAM(s) IV Push every 12 hours  gabapentin 100 milliGRAM(s) Oral every 8 hours  insulin regular Infusion 3 Unit(s)/Hr (3 mL/Hr) IV Continuous <Continuous>  metoclopramide  IVPB 10 milliGRAM(s) IV Intermittent every 8 hours  norepinephrine Infusion 0.03 MICROgram(s)/kG/Min (5.1 mL/Hr) IV Continuous <Continuous>  potassium chloride  10 mEq/50 mL IVPB 10 milliEquivalent(s) IV Intermittent every 1 hour  potassium chloride  10 mEq/50 mL IVPB 10 milliEquivalent(s) IV Intermittent every 1 hour  potassium chloride  10 mEq/50 mL IVPB 10 milliEquivalent(s) IV Intermittent every 1 hour  sodium chloride 0.9%. 1000 milliLiter(s) (10 mL/Hr) IV Continuous <Continuous>  vasopressin Infusion 0.06 Unit(s)/Min (3.6 mL/Hr) IV Continuous <Continuous>                                    12.3   8.23  )-----------( 240      ( 2022 00:31 )             37.8       01-06    135  |  102  |  32<H>  ----------------------------<  242<H>  3.6   |  20<L>  |  1.12    Ca    9.3      2022 00:31  Phos  2.6     01-06  Mg     1.8     01-06    TPro  6.6  /  Alb  3.9  /  TBili  0.6  /  DBili  x   /  AST  16  /  ALT  25  /  AlkPhos  61        PT/INR - ( 2022 00:31 )   PT: 13.1 sec;   INR: 1.10 ratio         PTT - ( 2022 02:15 )  PTT:40.2 sec    Mode: AC/ CMV (Assist Control/ Continuous Mandatory Ventilation)  RR (machine): 10  TV (machine): 650  FiO2: 100  PEEP: 8  ITime: 1  MAP: 8  PIP: 21      Daily Height in cm: 182.88 (2022 06:52)    Daily Weight in k.6 (2022 00:00)      - @ 07:01  -   @ 07:00  --------------------------------------------------------  IN: 836 mL / OUT: 1500 mL / NET: -664 mL        Critically Ill patient  : [ ] preoperative ,   [ x] post operative    Requires :  [x ] Arterial Line   [ x] Central Line  [ ] PA catheter  [x ] IABP  [ ] ECMO  [ ] LVAD  [x ] Ventilator  [ x] pacemaker - TPM  [ ] Impella.                      [ x] ABG's     [x ] Pulse Oxymetry Monitoring  Bedside evaluation , monitoring , treatment of hemodynamics , fluids , IVP/ IVCD meds.        Diagnosis:     Op day CABG X 3 L     Hypotension     Hypovolemia     Hemodynamic lability,  instability. Requires IVCD [ x] vasopressors [ ] inotropes  [ ] vasodilator  [x ]IVSS fluid  to maintain MAP, perfusion, C.I.     IABP   [ x] management   [ ] wean 1:1 1:2 1:3   [ ] removal and f/u vascular checks     IABP  pre op     Temporary pacemaker (TPM) interrogation and setting.     Requires  [x ]DDD  [ ]VVI    [ ]AII  temporary pacing at  80 min    to maintain HR, MAP, CI, and perfusion.     Chest Tube Drainage     Ventilator Management:  [x ]AC-rest    [ x]CPAP-PS Wean  this PM   [ ]Trach Collar     [ ]Extubate    [ ] T-Piece  [ ]peep>5     Requires chest PT, pulmonary toilet, ambu bagging, suctioning to maintain SaO2,  patent airway and treat atelectasis.     Prerenal Azotemia     Requires bedside physical therapy, mobilization and total detention care.     IVCD insulin                     Discussed with CT surgeon, Physician's Assistant - Nurse Practitioner- Critical care medicine team.   Discussed at  AM / PM rounds.   Chart, labs , films reviewed.    Cumulative Critical Care Time Given Today : 30 min

## 2022-01-06 NOTE — PRE-ANESTHESIA EVALUATION ADULT - NSANTHOSAYNRD_GEN_A_CORE
No. DARRION screening performed.  STOP BANG Legend: 0-2 = LOW Risk; 3-4 = INTERMEDIATE Risk; 5-8 = HIGH Risk

## 2022-01-06 NOTE — PRE-ANESTHESIA EVALUATION ADULT - NSANTHPMHFT_GEN_ALL_CORE
76M with PMH of HTN, HLD, T2DM, CAD, former smoker (1991), who underwent routine stress test= found to have inferior, inferolateral and septal ischemia. Pt with severe Left Main disease and 99% occlusion of mRCA, s/p balloon pump placement, here for CABG.

## 2022-01-06 NOTE — BRIEF OPERATIVE NOTE - OPERATION/FINDINGS
Pt is now s/p CABG x 3 [LIMA to D1, RSVG to LAD, RSVG to PDA].    ***LVEF nl  ***Left OR on Levo, Vaso, Precedex, Insulin

## 2022-01-06 NOTE — PATIENT PROFILE ADULT - FALL HARM RISK - HARM RISK INTERVENTIONS

## 2022-01-06 NOTE — PRE-ANESTHESIA EVALUATION ADULT - NSRADCARDRESULTSFT_GEN_ALL_CORE
ech< from: TTE with Doppler (w/Cont) (01.05.22 @ 18:05) >    CONCLUSIONS:  Technically very difficult study.  1. Endocardium not well visualized; grossly normal left  ventricular systolic function.  Estimated LVEF in the 65%  range (by visual estimate).  Endocardial visualization  enhanced with intravenous injection of Ultrasonic Enhancing  Agent (Definity).  2. The right ventricle is not well visualized; grossly  normal right ventricular systolic function.  3. Unable to accurately evaluate valvular function.    < end of copied text >

## 2022-01-06 NOTE — AIRWAY REMOVAL NOTE  ADULT & PEDS - ARTIFICAL AIRWAY REMOVAL COMMENTS
I called and spoke with Birdie. I notified her that we had placed the appeal letter in the mail from Dr. Cardozo on April 3rd. I also notified her that we had attempted to fax it several times and had called for a different phone number and it still did not go through.    I told her that I would let her know as soon as we heard something.    She then said that she was concerned that it was marked urgent and that it should only have been marked urgent if it is life or death. I did tell her that it was already mailed yesterday so we will have to see what they say.    No further questions at this time.  
Written order for extubation verified. The patient was identified by full name and birth date compared to the identification band. Present during the procedure was Farhat Parisi RN.
[employer] dose 1

## 2022-01-06 NOTE — BRIEF OPERATIVE NOTE - NSICDXBRIEFPROCEDURE_GEN_ALL_CORE_FT
PROCEDURES:  Bypass graft, coronary artery, 1 arterial and 2 venous 06-Jan-2022 15:00:10  Aldo Mack

## 2022-01-06 NOTE — PROGRESS NOTE ADULT - SUBJECTIVE AND OBJECTIVE BOX
CRITICAL CARE ATTENDING - CTICU    MEDICATIONS  (STANDING):  atorvastatin 80 milliGRAM(s) Oral at bedtime  cefuroxime  IVPB 1500 milliGRAM(s) IV Intermittent once  heparin  Infusion 1000 Unit(s)/Hr (10 mL/Hr) IV Continuous <Continuous>  metoprolol tartrate 25 milliGRAM(s) Oral every 8 hours  mupirocin 2% Ointment 1 Application(s) Both Nostrils every 12 hours  pantoprazole    Tablet 40 milliGRAM(s) Oral before breakfast  sodium chloride 0.9% lock flush 3 milliLiter(s) IV Push every 8 hours                                    12.3   8.23  )-----------( 240      ( 2022 00:31 )             37.8       01-    135  |  102  |  32<H>  ----------------------------<  242<H>  3.6   |  20<L>  |  1.12    Ca    9.3      2022 00:31  Phos  2.6       Mg     1.8         TPro  6.6  /  Alb  3.9  /  TBili  0.6  /  DBili  x   /  AST  16  /  ALT  25  /  AlkPhos  61  -      PT/INR - ( 2022 00:31 )   PT: 13.1 sec;   INR: 1.10 ratio         PTT - ( 2022 02:15 )  PTT:40.2 sec        Daily Height in cm: 182.88 (2022 06:52)    Daily Weight in k.6 (2022 00:00)       @ 07:01  -  - @ 07:00  --------------------------------------------------------  IN: 836 mL / OUT: 1500 mL / NET: -664 mL        Critically Ill patient  : [ ] preoperative ,   [ x] post operative    Requires :  [ ] Arterial Line   [ ] Central Line  [ ] PA catheter  [x ] IABP  [ ] ECMO  [ ] LVAD  [ ] Ventilator  [ ] pacemaker [ ] Impella.                      [x ] ABG's     [ x] Pulse Oxymetry Monitoring  Bedside evaluation , monitoring , treatment of hemodynamics , fluids , IVP/ IVCD meds.        Diagnosis:     LHC distal LM 99%, mid RCA 99%, preop CABG     IABP   [ x] management   [ ] wean 1:1 1:2 1:3   [ ] removal and f/u vascular checks     IVCD anticoagulation with [x ] Heparin  [ ] Argatroban for IABP circuit     Prerenal azotemia           By signing my name below, I, Ana Flor, attest that this documentation has been prepared under the direction and in the presence of Shadi Guillen MD.   Electronically Signed: Quentin Rosenberg 22 @ 07:42      Discussed with CT surgeon, Physician Assistant - Nurse Practitioner- Critical care medicine team.   Discussed at  AM / PM rounds.   Chart, labs , films reviewed.    Cumulative Critical Care Time Given Today:

## 2022-01-07 LAB
ALBUMIN SERPL ELPH-MCNC: 3.6 G/DL — SIGNIFICANT CHANGE UP (ref 3.3–5)
ALP SERPL-CCNC: 40 U/L — SIGNIFICANT CHANGE UP (ref 40–120)
ALT FLD-CCNC: 23 U/L — SIGNIFICANT CHANGE UP (ref 10–45)
ANION GAP SERPL CALC-SCNC: 11 MMOL/L — SIGNIFICANT CHANGE UP (ref 5–17)
APTT BLD: 26.2 SEC — LOW (ref 27.5–35.5)
AST SERPL-CCNC: 32 U/L — SIGNIFICANT CHANGE UP (ref 10–40)
BASE EXCESS BLDV CALC-SCNC: -1.3 MMOL/L — SIGNIFICANT CHANGE UP (ref -2–2)
BASE EXCESS BLDV CALC-SCNC: 0 MMOL/L — SIGNIFICANT CHANGE UP (ref -2–2)
BASOPHILS # BLD AUTO: 0.01 K/UL — SIGNIFICANT CHANGE UP (ref 0–0.2)
BASOPHILS NFR BLD AUTO: 0.1 % — SIGNIFICANT CHANGE UP (ref 0–2)
BILIRUB SERPL-MCNC: 0.8 MG/DL — SIGNIFICANT CHANGE UP (ref 0.2–1.2)
BUN SERPL-MCNC: 24 MG/DL — HIGH (ref 7–23)
CALCIUM SERPL-MCNC: 8 MG/DL — LOW (ref 8.4–10.5)
CHLORIDE SERPL-SCNC: 109 MMOL/L — HIGH (ref 96–108)
CO2 BLDV-SCNC: 26 MMOL/L — SIGNIFICANT CHANGE UP (ref 22–26)
CO2 BLDV-SCNC: 28 MMOL/L — HIGH (ref 22–26)
CO2 SERPL-SCNC: 22 MMOL/L — SIGNIFICANT CHANGE UP (ref 22–31)
CREAT SERPL-MCNC: 1.09 MG/DL — SIGNIFICANT CHANGE UP (ref 0.5–1.3)
EOSINOPHIL # BLD AUTO: 0.14 K/UL — SIGNIFICANT CHANGE UP (ref 0–0.5)
EOSINOPHIL NFR BLD AUTO: 1.8 % — SIGNIFICANT CHANGE UP (ref 0–6)
GAS PNL BLDA: SIGNIFICANT CHANGE UP
GAS PNL BLDV: SIGNIFICANT CHANGE UP
GAS PNL BLDV: SIGNIFICANT CHANGE UP
GLUCOSE BLDA-MCNC: 132 MG/DL — HIGH (ref 70–99)
GLUCOSE SERPL-MCNC: 119 MG/DL — HIGH (ref 70–99)
HCO3 BLDV-SCNC: 25 MMOL/L — SIGNIFICANT CHANGE UP (ref 22–29)
HCO3 BLDV-SCNC: 26 MMOL/L — SIGNIFICANT CHANGE UP (ref 22–29)
HCT VFR BLD CALC: 25.7 % — LOW (ref 39–50)
HGB BLD-MCNC: 8.6 G/DL — LOW (ref 13–17)
HOROWITZ INDEX BLDV+IHG-RTO: 40 — SIGNIFICANT CHANGE UP
HOROWITZ INDEX BLDV+IHG-RTO: 40 — SIGNIFICANT CHANGE UP
IMM GRANULOCYTES NFR BLD AUTO: 0.4 % — SIGNIFICANT CHANGE UP (ref 0–1.5)
INR BLD: 1.11 RATIO — SIGNIFICANT CHANGE UP (ref 0.88–1.16)
LYMPHOCYTES # BLD AUTO: 0.64 K/UL — LOW (ref 1–3.3)
LYMPHOCYTES # BLD AUTO: 8 % — LOW (ref 13–44)
MAGNESIUM SERPL-MCNC: 2.7 MG/DL — HIGH (ref 1.6–2.6)
MCHC RBC-ENTMCNC: 28.9 PG — SIGNIFICANT CHANGE UP (ref 27–34)
MCHC RBC-ENTMCNC: 33.5 GM/DL — SIGNIFICANT CHANGE UP (ref 32–36)
MCV RBC AUTO: 86.2 FL — SIGNIFICANT CHANGE UP (ref 80–100)
MONOCYTES # BLD AUTO: 0.6 K/UL — SIGNIFICANT CHANGE UP (ref 0–0.9)
MONOCYTES NFR BLD AUTO: 7.5 % — SIGNIFICANT CHANGE UP (ref 2–14)
NEUTROPHILS # BLD AUTO: 6.54 K/UL — SIGNIFICANT CHANGE UP (ref 1.8–7.4)
NEUTROPHILS NFR BLD AUTO: 82.2 % — HIGH (ref 43–77)
NRBC # BLD: 0 /100 WBCS — SIGNIFICANT CHANGE UP (ref 0–0)
PCO2 BLDV: 46 MMHG — SIGNIFICANT CHANGE UP (ref 42–55)
PCO2 BLDV: 49 MMHG — SIGNIFICANT CHANGE UP (ref 42–55)
PH BLDV: 7.34 — SIGNIFICANT CHANGE UP (ref 7.32–7.43)
PH BLDV: 7.34 — SIGNIFICANT CHANGE UP (ref 7.32–7.43)
PHOSPHATE SERPL-MCNC: 3.2 MG/DL — SIGNIFICANT CHANGE UP (ref 2.5–4.5)
PLATELET # BLD AUTO: 186 K/UL — SIGNIFICANT CHANGE UP (ref 150–400)
PO2 BLDV: 38 MMHG — SIGNIFICANT CHANGE UP (ref 25–45)
PO2 BLDV: 41 MMHG — SIGNIFICANT CHANGE UP (ref 25–45)
POTASSIUM BLDA-SCNC: 4.1 MMOL/L — SIGNIFICANT CHANGE UP (ref 3.5–5.1)
POTASSIUM SERPL-MCNC: 4.5 MMOL/L — SIGNIFICANT CHANGE UP (ref 3.5–5.3)
POTASSIUM SERPL-SCNC: 4.5 MMOL/L — SIGNIFICANT CHANGE UP (ref 3.5–5.3)
PROT SERPL-MCNC: 5.4 G/DL — LOW (ref 6–8.3)
PROTHROM AB SERPL-ACNC: 13.3 SEC — SIGNIFICANT CHANGE UP (ref 10.6–13.6)
RBC # BLD: 2.98 M/UL — LOW (ref 4.2–5.8)
RBC # FLD: 15.1 % — HIGH (ref 10.3–14.5)
SAO2 % BLDV: 67.4 % — SIGNIFICANT CHANGE UP (ref 67–88)
SAO2 % BLDV: 69.6 % — SIGNIFICANT CHANGE UP (ref 67–88)
SODIUM SERPL-SCNC: 142 MMOL/L — SIGNIFICANT CHANGE UP (ref 135–145)
WBC # BLD: 7.96 K/UL — SIGNIFICANT CHANGE UP (ref 3.8–10.5)
WBC # FLD AUTO: 7.96 K/UL — SIGNIFICANT CHANGE UP (ref 3.8–10.5)

## 2022-01-07 PROCEDURE — 71045 X-RAY EXAM CHEST 1 VIEW: CPT | Mod: 26

## 2022-01-07 PROCEDURE — 33968 REMOVE AORTIC ASSIST DEVICE: CPT | Mod: 58

## 2022-01-07 PROCEDURE — 99291 CRITICAL CARE FIRST HOUR: CPT

## 2022-01-07 PROCEDURE — 93010 ELECTROCARDIOGRAM REPORT: CPT

## 2022-01-07 RX ORDER — DEXTROSE 50 % IN WATER 50 %
15 SYRINGE (ML) INTRAVENOUS ONCE
Refills: 0 | Status: DISCONTINUED | OUTPATIENT
Start: 2022-01-07 | End: 2022-01-11

## 2022-01-07 RX ORDER — INSULIN LISPRO 100/ML
VIAL (ML) SUBCUTANEOUS AT BEDTIME
Refills: 0 | Status: DISCONTINUED | OUTPATIENT
Start: 2022-01-07 | End: 2022-01-08

## 2022-01-07 RX ORDER — ALBUMIN HUMAN 25 %
250 VIAL (ML) INTRAVENOUS ONCE
Refills: 0 | Status: COMPLETED | OUTPATIENT
Start: 2022-01-07 | End: 2022-01-07

## 2022-01-07 RX ORDER — GLUCAGON INJECTION, SOLUTION 0.5 MG/.1ML
1 INJECTION, SOLUTION SUBCUTANEOUS ONCE
Refills: 0 | Status: DISCONTINUED | OUTPATIENT
Start: 2022-01-07 | End: 2022-01-11

## 2022-01-07 RX ORDER — POTASSIUM CHLORIDE 20 MEQ
10 PACKET (EA) ORAL
Refills: 0 | Status: DISCONTINUED | OUTPATIENT
Start: 2022-01-07 | End: 2022-01-07

## 2022-01-07 RX ORDER — SODIUM CHLORIDE 9 MG/ML
1000 INJECTION, SOLUTION INTRAVENOUS
Refills: 0 | Status: DISCONTINUED | OUTPATIENT
Start: 2022-01-07 | End: 2022-01-11

## 2022-01-07 RX ORDER — ENOXAPARIN SODIUM 100 MG/ML
40 INJECTION SUBCUTANEOUS DAILY
Refills: 0 | Status: DISCONTINUED | OUTPATIENT
Start: 2022-01-07 | End: 2022-01-11

## 2022-01-07 RX ORDER — INSULIN GLARGINE 100 [IU]/ML
5 INJECTION, SOLUTION SUBCUTANEOUS AT BEDTIME
Refills: 0 | Status: DISCONTINUED | OUTPATIENT
Start: 2022-01-07 | End: 2022-01-08

## 2022-01-07 RX ORDER — SODIUM CHLORIDE 9 MG/ML
1000 INJECTION, SOLUTION INTRAVENOUS
Refills: 0 | Status: DISCONTINUED | OUTPATIENT
Start: 2022-01-07 | End: 2022-01-08

## 2022-01-07 RX ORDER — METOPROLOL TARTRATE 50 MG
12.5 TABLET ORAL EVERY 12 HOURS
Refills: 0 | Status: DISCONTINUED | OUTPATIENT
Start: 2022-01-07 | End: 2022-01-08

## 2022-01-07 RX ORDER — ALBUMIN HUMAN 25 %
250 VIAL (ML) INTRAVENOUS
Refills: 0 | Status: COMPLETED | OUTPATIENT
Start: 2022-01-07 | End: 2022-01-07

## 2022-01-07 RX ORDER — INSULIN LISPRO 100/ML
VIAL (ML) SUBCUTANEOUS
Refills: 0 | Status: DISCONTINUED | OUTPATIENT
Start: 2022-01-07 | End: 2022-01-08

## 2022-01-07 RX ORDER — METOCLOPRAMIDE HCL 10 MG
10 TABLET ORAL EVERY 8 HOURS
Refills: 0 | Status: COMPLETED | OUTPATIENT
Start: 2022-01-07 | End: 2022-01-09

## 2022-01-07 RX ORDER — INSULIN LISPRO 100/ML
3 VIAL (ML) SUBCUTANEOUS
Refills: 0 | Status: DISCONTINUED | OUTPATIENT
Start: 2022-01-07 | End: 2022-01-08

## 2022-01-07 RX ORDER — METOPROLOL TARTRATE 50 MG
12.5 TABLET ORAL ONCE
Refills: 0 | Status: COMPLETED | OUTPATIENT
Start: 2022-01-07 | End: 2022-01-07

## 2022-01-07 RX ADMIN — POLYETHYLENE GLYCOL 3350 17 GRAM(S): 17 POWDER, FOR SOLUTION ORAL at 11:46

## 2022-01-07 RX ADMIN — Medication 650 MILLIGRAM(S): at 20:37

## 2022-01-07 RX ADMIN — Medication 5.1 MICROGRAM(S)/KG/MIN: at 13:57

## 2022-01-07 RX ADMIN — CHLORHEXIDINE GLUCONATE 1 APPLICATION(S): 213 SOLUTION TOPICAL at 12:24

## 2022-01-07 RX ADMIN — Medication 100 MILLIGRAM(S): at 15:43

## 2022-01-07 RX ADMIN — Medication 10 MILLIGRAM(S): at 08:05

## 2022-01-07 RX ADMIN — Medication 650 MILLIGRAM(S): at 15:06

## 2022-01-07 RX ADMIN — PANTOPRAZOLE SODIUM 40 MILLIGRAM(S): 20 TABLET, DELAYED RELEASE ORAL at 07:37

## 2022-01-07 RX ADMIN — Medication 650 MILLIGRAM(S): at 09:30

## 2022-01-07 RX ADMIN — Medication 1 TABLET(S): at 21:04

## 2022-01-07 RX ADMIN — Medication 12.5 MILLIGRAM(S): at 16:53

## 2022-01-07 RX ADMIN — Medication 325 MILLIGRAM(S): at 11:44

## 2022-01-07 RX ADMIN — Medication 125 MILLILITER(S): at 11:44

## 2022-01-07 RX ADMIN — Medication 100 MILLIGRAM(S): at 07:43

## 2022-01-07 RX ADMIN — Medication 10 MILLIGRAM(S): at 17:46

## 2022-01-07 RX ADMIN — GABAPENTIN 100 MILLIGRAM(S): 400 CAPSULE ORAL at 06:55

## 2022-01-07 RX ADMIN — INSULIN GLARGINE 5 UNIT(S): 100 INJECTION, SOLUTION SUBCUTANEOUS at 21:09

## 2022-01-07 RX ADMIN — Medication 25 GRAM(S): at 00:11

## 2022-01-07 RX ADMIN — OXYCODONE HYDROCHLORIDE 5 MILLIGRAM(S): 5 TABLET ORAL at 19:31

## 2022-01-07 RX ADMIN — Medication 650 MILLIGRAM(S): at 09:51

## 2022-01-07 RX ADMIN — Medication 650 MILLIGRAM(S): at 15:07

## 2022-01-07 RX ADMIN — OXYCODONE HYDROCHLORIDE 5 MILLIGRAM(S): 5 TABLET ORAL at 19:46

## 2022-01-07 RX ADMIN — Medication 500 MILLIGRAM(S): at 17:46

## 2022-01-07 RX ADMIN — Medication 104 MILLIGRAM(S): at 07:43

## 2022-01-07 RX ADMIN — Medication 650 MILLIGRAM(S): at 20:22

## 2022-01-07 RX ADMIN — VASOPRESSIN 3.6 UNIT(S)/MIN: 20 INJECTION INTRAVENOUS at 13:58

## 2022-01-07 RX ADMIN — SENNA PLUS 2 TABLET(S): 8.6 TABLET ORAL at 21:03

## 2022-01-07 RX ADMIN — ENOXAPARIN SODIUM 40 MILLIGRAM(S): 100 INJECTION SUBCUTANEOUS at 11:44

## 2022-01-07 RX ADMIN — Medication 100 MILLIGRAM(S): at 00:50

## 2022-01-07 RX ADMIN — INSULIN HUMAN 3 UNIT(S)/HR: 100 INJECTION, SOLUTION SUBCUTANEOUS at 13:58

## 2022-01-07 RX ADMIN — Medication 125 MILLILITER(S): at 13:56

## 2022-01-07 RX ADMIN — GABAPENTIN 100 MILLIGRAM(S): 400 CAPSULE ORAL at 21:03

## 2022-01-07 RX ADMIN — ATORVASTATIN CALCIUM 80 MILLIGRAM(S): 80 TABLET, FILM COATED ORAL at 21:03

## 2022-01-07 RX ADMIN — GABAPENTIN 100 MILLIGRAM(S): 400 CAPSULE ORAL at 13:57

## 2022-01-07 RX ADMIN — Medication 500 MILLIGRAM(S): at 06:55

## 2022-01-07 RX ADMIN — Medication 100 MILLIGRAM(S): at 23:32

## 2022-01-07 RX ADMIN — Medication 12.5 MILLIGRAM(S): at 17:46

## 2022-01-07 RX ADMIN — SODIUM CHLORIDE 10 MILLILITER(S): 9 INJECTION INTRAMUSCULAR; INTRAVENOUS; SUBCUTANEOUS at 13:58

## 2022-01-07 NOTE — PHYSICAL THERAPY INITIAL EVALUATION ADULT - PLANNED THERAPY INTERVENTIONS, PT EVAL
GOAL: Stair Negotiation Training: Patient will be able to negotiate up & down 1 flight of stairs with unilateral rail, step to gait pattern, in 2 weeks./balance training/bed mobility training/gait training/strengthening/transfer training

## 2022-01-07 NOTE — CONSULT NOTE ADULT - ASSESSMENT
A/P    76 yr old male with PMH of HTN, HLD, Type 2 DM, CAD, former smoker here for elective cardiac cath for recent abnl stress with severe distal left main, RCA disease, s/p CABG    #CAD, s/p CABG for severe LM disease  -cv stable, sitting up in chair   -care per cticu   -cont asa/statin      dvt ppx

## 2022-01-07 NOTE — PHYSICAL THERAPY INITIAL EVALUATION ADULT - PRECAUTIONS/LIMITATIONS, REHAB EVAL
Intraoperative AUBREY 1/6/22: Minimal mitral regurgitation. Minimal tricuspid regurgitation. Minimal pulmonic regurgitation. Normal pericardium with trace pericardial effusion./cardiac precautions/fall precautions/sternal precautions

## 2022-01-07 NOTE — DIETITIAN INITIAL EVALUATION ADULT. - PERTINENT MEDS FT
MEDICATIONS  (STANDING):  acetaminophen     Tablet .. 650 milliGRAM(s) Oral every 6 hours  albumin human  5% IVPB 250 milliLiter(s) IV Intermittent every 1 hour  ascorbic acid 500 milliGRAM(s) Oral two times a day  aspirin enteric coated 325 milliGRAM(s) Oral daily  atorvastatin 80 milliGRAM(s) Oral at bedtime  cefuroxime  IVPB 1500 milliGRAM(s) IV Intermittent every 8 hours  chlorhexidine 2% Cloths 1 Application(s) Topical daily  dextrose 50% Injectable 50 milliLiter(s) IV Push every 15 minutes  gabapentin 100 milliGRAM(s) Oral every 8 hours  insulin regular Infusion 3 Unit(s)/Hr (3 mL/Hr) IV Continuous <Continuous>  metoclopramide  IVPB 10 milliGRAM(s) IV Intermittent every 8 hours  norepinephrine Infusion 0.03 MICROgram(s)/kG/Min (5.1 mL/Hr) IV Continuous <Continuous>  pantoprazole    Tablet 40 milliGRAM(s) Oral before breakfast  polyethylene glycol 3350 17 Gram(s) Oral daily  potassium chloride  10 mEq/50 mL IVPB 10 milliEquivalent(s) IV Intermittent every 1 hour  potassium chloride  10 mEq/50 mL IVPB 10 milliEquivalent(s) IV Intermittent every 1 hour  potassium chloride  10 mEq/50 mL IVPB 10 milliEquivalent(s) IV Intermittent every 1 hour  senna 2 Tablet(s) Oral at bedtime  sodium chloride 0.9%. 1000 milliLiter(s) (10 mL/Hr) IV Continuous <Continuous>  vasopressin Infusion 0.06 Unit(s)/Min (3.6 mL/Hr) IV Continuous <Continuous>    MEDICATIONS  (PRN):  HYDROmorphone  Injectable 0.5 milliGRAM(s) IV Push every 6 hours PRN Severe Pain (7 - 10)  oxyCODONE    IR 5 milliGRAM(s) Oral every 4 hours PRN Moderate Pain (4 - 6)  oxyCODONE    IR 10 milliGRAM(s) Oral every 4 hours PRN Severe Pain (7 - 10)

## 2022-01-07 NOTE — PHYSICAL THERAPY INITIAL EVALUATION ADULT - PERTINENT HX OF CURRENT PROBLEM, REHAB EVAL
Pt is a 76 yr old male with PMH of HTN, HLD, Type 2 DM, CAD, former smoker presented to Dr. Augusto Cordero for follow up and underwent stress test which showed inferior, inferolateral, and septal ischemia, EF 60%. Patient presents for further ischemic workup. LHC showed Left main disease, now consulted for CT Surgery evaluation. Pt s/p CABGx3 on 1/6/22. +CXR 1/6/22: Small left pleural effusion with associated passive atelectasis. Continued below.

## 2022-01-07 NOTE — DIETITIAN INITIAL EVALUATION ADULT. - ORAL INTAKE PTA/DIET HISTORY
Unable to obtain much subjective information from pt at this time; pt remains intubated. Per chart, NKFA, and Micronutrient supplementation PTA includes Vitamin D3 & Vitamin B12. Unknown chewing/swallowing function PTA at this time. Unable to obtain much subjective information from pt at this time; pt sleeping upon multiple visits POD1 CABG. Per chart, NKFA, and Micronutrient supplementation PTA includes Vitamin D3 & Vitamin B12. Unknown chewing/swallowing function PTA at this time.

## 2022-01-07 NOTE — DIETITIAN INITIAL EVALUATION ADULT. - DIET TYPE
+ Multivitamin/consistent carbohydrate (no snacks)/DASH/TLC (sodium and cholesterol restricted diet)

## 2022-01-07 NOTE — PROGRESS NOTE ADULT - SUBJECTIVE AND OBJECTIVE BOX
CRITICAL CARE ATTENDING - CTICU    MEDICATIONS  (STANDING):  acetaminophen     Tablet .. 650 milliGRAM(s) Oral every 6 hours  albumin human  5% IVPB 250 milliLiter(s) IV Intermittent every 1 hour  ascorbic acid 500 milliGRAM(s) Oral two times a day  aspirin enteric coated 325 milliGRAM(s) Oral daily  atorvastatin 80 milliGRAM(s) Oral at bedtime  cefuroxime  IVPB 1500 milliGRAM(s) IV Intermittent every 8 hours  chlorhexidine 2% Cloths 1 Application(s) Topical daily  dextrose 50% Injectable 50 milliLiter(s) IV Push every 15 minutes  gabapentin 100 milliGRAM(s) Oral every 8 hours  insulin regular Infusion 3 Unit(s)/Hr (3 mL/Hr) IV Continuous <Continuous>  metoclopramide  IVPB 10 milliGRAM(s) IV Intermittent every 8 hours  norepinephrine Infusion 0.03 MICROgram(s)/kG/Min (5.1 mL/Hr) IV Continuous <Continuous>  pantoprazole    Tablet 40 milliGRAM(s) Oral before breakfast  polyethylene glycol 3350 17 Gram(s) Oral daily  potassium chloride  10 mEq/50 mL IVPB 10 milliEquivalent(s) IV Intermittent every 1 hour  potassium chloride  10 mEq/50 mL IVPB 10 milliEquivalent(s) IV Intermittent every 1 hour  potassium chloride  10 mEq/50 mL IVPB 10 milliEquivalent(s) IV Intermittent every 1 hour  senna 2 Tablet(s) Oral at bedtime  sodium chloride 0.9%. 1000 milliLiter(s) (10 mL/Hr) IV Continuous <Continuous>  vasopressin Infusion 0.06 Unit(s)/Min (3.6 mL/Hr) IV Continuous <Continuous>                                    8.6    7.96  )-----------( 186      ( 2022 02:03 )             25.7       01-07    142  |  109<H>  |  24<H>  ----------------------------<  119<H>  4.5   |  22  |  1.09    Ca    8.0<L>      2022 02:03  Phos  3.2     01-07  Mg     2.7     01-07    TPro  5.4<L>  /  Alb  3.6  /  TBili  0.8  /  DBili  x   /  AST  32  /  ALT  23  /  AlkPhos  40  01-07      PT/INR - ( 2022 02:03 )   PT: 13.3 sec;   INR: 1.11 ratio         PTT - ( 2022 02:03 )  PTT:26.2 sec        Daily     Daily Weight in k.1 (2022 01:00)       @ 07:01  -   @ 07:00  --------------------------------------------------------  IN: 4532.7 mL / OUT: 2720 mL / NET: 1812.7 mL        Critically Ill patient  : [ ] preoperative ,   [ x] post operative    Requires :  [x ] Arterial Line   [ x] Central Line  [ ] PA catheter  [x ] IABP  [ ] ECMO  [ ] LVAD  [ ] Ventilator  [ x] pacemaker - TPM  [ ] Impella.                      [ x] ABG's     [x ] Pulse Oxymetry Monitoring  Bedside evaluation , monitoring , treatment of hemodynamics , fluids , IVP/ IVCD meds.        Diagnosis:     POD 1 - CABG X 3 L     Preop IABP     IABP   [ x] management   [ ] wean 1:1 1:2 1:3   [ ] removal and f/u vascular checks     Hypotension     Hypovolemia     Hemodynamic lability,  instability. Requires IVCD [ x] vasopressors [ ] inotropes  [ ] vasodilator  [x ]IVSS fluid  to maintain MAP, perfusion, C.I.     Temporary pacemaker (TPM) interrogation and setting.     Requires  [ ]DDD  [x ]VVI    [ ]AII  temporary pacing at  40 min    to maintain HR, MAP, CI, and perfusion.     Chest Tube Drainage     Requires chest PT, pulmonary toilet, suctioning to maintain SaO2,  patent airway and treat atelectasis.     Prerenal Azotemia     Requires bedside physical therapy, mobilization and total senior living care.     IVCD insulin                   By signing my name below, I, Ana Flor, attest that this documentation has been prepared under the direction and in the presence of Shadi Guillen MD.   Electronically Signed: Quentin Rosenberg 22 @ 07:34      Discussed with CT surgeon, Physician Assistant - Nurse Practitioner- Critical care medicine team.   Discussed at  AM / PM rounds.   Chart, labs , films reviewed.    Cumulative Critical Care Time Given Today:  CRITICAL CARE ATTENDING - CTICU    MEDICATIONS  (STANDING):  acetaminophen     Tablet .. 650 milliGRAM(s) Oral every 6 hours  albumin human  5% IVPB 250 milliLiter(s) IV Intermittent every 1 hour  ascorbic acid 500 milliGRAM(s) Oral two times a day  aspirin enteric coated 325 milliGRAM(s) Oral daily  atorvastatin 80 milliGRAM(s) Oral at bedtime  cefuroxime  IVPB 1500 milliGRAM(s) IV Intermittent every 8 hours  chlorhexidine 2% Cloths 1 Application(s) Topical daily  dextrose 50% Injectable 50 milliLiter(s) IV Push every 15 minutes  gabapentin 100 milliGRAM(s) Oral every 8 hours  insulin regular Infusion 3 Unit(s)/Hr (3 mL/Hr) IV Continuous <Continuous>  metoclopramide  IVPB 10 milliGRAM(s) IV Intermittent every 8 hours  norepinephrine Infusion 0.03 MICROgram(s)/kG/Min (5.1 mL/Hr) IV Continuous <Continuous>  pantoprazole    Tablet 40 milliGRAM(s) Oral before breakfast  polyethylene glycol 3350 17 Gram(s) Oral daily  potassium chloride  10 mEq/50 mL IVPB 10 milliEquivalent(s) IV Intermittent every 1 hour  potassium chloride  10 mEq/50 mL IVPB 10 milliEquivalent(s) IV Intermittent every 1 hour  potassium chloride  10 mEq/50 mL IVPB 10 milliEquivalent(s) IV Intermittent every 1 hour  senna 2 Tablet(s) Oral at bedtime  sodium chloride 0.9%. 1000 milliLiter(s) (10 mL/Hr) IV Continuous <Continuous>  vasopressin Infusion 0.06 Unit(s)/Min (3.6 mL/Hr) IV Continuous <Continuous>                                    8.6    7.96  )-----------( 186      ( 2022 02:03 )             25.7       01-07    142  |  109<H>  |  24<H>  ----------------------------<  119<H>  4.5   |  22  |  1.09    Ca    8.0<L>      2022 02:03  Phos  3.2     01-07  Mg     2.7     01-07    TPro  5.4<L>  /  Alb  3.6  /  TBili  0.8  /  DBili  x   /  AST  32  /  ALT  23  /  AlkPhos  40  01-07      PT/INR - ( 2022 02:03 )   PT: 13.3 sec;   INR: 1.11 ratio         PTT - ( 2022 02:03 )  PTT:26.2 sec        Daily     Daily Weight in k.1 (2022 01:00)       @ 07:01  -   @ 07:00  --------------------------------------------------------  IN: 4532.7 mL / OUT: 2720 mL / NET: 1812.7 mL        Critically Ill patient  : [ ] preoperative ,   [ x] post operative    Requires :  [x ] Arterial Line   [ x] Central Line  [ ] PA catheter  [x ] IABP  [ ] ECMO  [ ] LVAD  [ ] Ventilator  [ x] pacemaker - TPM  [ ] Impella.                      [ x] ABG's     [x ] Pulse Oxymetry Monitoring  Bedside evaluation , monitoring , treatment of hemodynamics , fluids , IVP/ IVCD meds.        Diagnosis:     POD 1 - CABG X 3 L     Preop IABP     IABP   [ x] management   [ x] wean 1:1 1:2 1:3   [x ] removal and f/u vascular checks this AM    Hypotension     Hypovolemia     Hemodynamic lability,  instability. Requires IVCD [ x] vasopressors [ ] inotropes  [ ] vasodilator  [x ]IVSS fluid  to maintain MAP, perfusion, C.I.     Temporary pacemaker (TPM) interrogation and setting.     DDD pacing last night     Requires  [ ]DDD  [x ]VVI    [ ]AII  temporary pacing at  40 min     Chest Tube Drainage     Requires chest PT, pulmonary toilet, suctioning to maintain SaO2,  patent airway and treat atelectasis.     Prerenal Azotemia     Requires bedside physical therapy, mobilization and total [facility] care.     IVCD insulin                   By signing my name below, IAna, attest that this documentation has been prepared under the direction and in the presence of Shadi Guillen MD.   Electronically Signed: Quentin Rosenberg 22 @ 07:34    IShadi, personally performed the services described in this documentation. All medical record entries made by the scribe were at my direction and in my presence. I have reviewed the chart and agree that the record reflects my personal performance and is accurate and complete.   Shadi Guillen MD.       Discussed with CT surgeon, Physician Assistant - Nurse Practitioner- Critical care medicine team.   Discussed at  AM / PM rounds.   Chart, labs , films reviewed.    Cumulative Critical Care Time Given Today:  30 min

## 2022-01-07 NOTE — PHYSICAL THERAPY INITIAL EVALUATION ADULT - ADDITIONAL COMMENTS
Pt lives alone in a house with 3 steps to enter, +HR and 1 flight of stairs, +HR. Pt was independent with all ADLs & ambulation PTA. Pt did not use a AD for ambulation PTA. Pt states his girlfriend, cousin and niece are available to assist when needed. +eyeglasses. +drives.

## 2022-01-07 NOTE — DIETITIAN INITIAL EVALUATION ADULT. - CHIEF COMPLAINT
76M, PMH of HTN, HLD, Type 2 DM, CAD, former smoker presented to Dr. Augusto Cordero for follow up and underwent stress test which showed inferior, inferolateral, and septal ischemia, EF 60%. Patient presents today for further ischemic workup. LHC showed Left main disease, now consulted for CT Surgery evaluation. S/p CABG x3 (1/6) with IABP removed 1/7.

## 2022-01-07 NOTE — DIETITIAN INITIAL EVALUATION ADULT. - ADD RECOMMEND
Recommend Consistent CHO, DASH diet. Consider addition of Multivitamin supplementation pending no existing contraindications. RD to provide Diet Mighty Shakes TID. RD to follow-up and provide diet education as appropriate. Monitor PO intake/tolerance, weights, labs, hydration status, bowels, and skin integrity.

## 2022-01-07 NOTE — CONSULT NOTE ADULT - ASSESSMENT
A/P    76 yr old male with PMH of HTN, HLD, Type 2 DM, CAD, former smoker here for elective cardiac cath for recent abnl stress with severe distal left main, RCA disease, s/p CABG    #CAD, s/p CABG for severe LM disease  -cv stable, sitting up in chair   -care per cticu   -cont asa/statin

## 2022-01-07 NOTE — DIETITIAN INITIAL EVALUATION ADULT. - PERTINENT LABORATORY DATA
01-07 Na142 mmol/L Glu 119 mg/dL<H> K+ 4.5 mmol/L Cr  1.09 mg/dL BUN 24 mg/dL<H> Phos 3.2 mg/dL Alb 3.6 g/dL PAB n/a

## 2022-01-07 NOTE — PHYSICAL THERAPY INITIAL EVALUATION ADULT - GENERAL OBSERVATIONS, REHAB EVAL
Pt received sitting in recliner with +cardiac monitor, +pulse ox, +BP cuff, +a-line, +R IJ, +barger, +2 chest tubes, +temp probe, and +3LNC. NAD noted.

## 2022-01-07 NOTE — PROCEDURE NOTE - ADDITIONAL PROCEDURE DETAILS
The IABP(Intra-aortic balloon pump) was weaned according to protocol. Hemodynamics remained stable. Pulses in the right lower  extremity are palpable.  The patient was placed in the supine position. The insertion site was identified and the sutures were removed. The IABP was turned off and the balloon deflated. The IABP was then removed. Direct pressure was applied for 30 minutes.     Monitoring of the Right groin and both lower extremities including neuro-vascular checks and vital signs every 15 minutes x4, then every 30 minutes x2, then every 1hr x4 was ordered.    Complications: None    Date/ Time: 1/7/2022

## 2022-01-07 NOTE — DIETITIAN INITIAL EVALUATION ADULT. - REASON INDICATOR FOR ASSESSMENT
Nutrition consult warranted for Assessment/Education - Cardiac Surgery Admission  Source: EMR, Team; Pt intubated at this time Nutrition consult warranted for Assessment/Education - Cardiac Surgery Admission  Source: EMR, Team; Pt sleeping upon multiple visits

## 2022-01-07 NOTE — DIETITIAN INITIAL EVALUATION ADULT. - OTHER INFO
GI: Last BM 1/5 - bowel regimen ordered (miralax, senna, dulcolax)    Stated dosing weight (1/6) 199.9Ibs, yet bedscale weight (1/6) 206.3Ibs    Pt with Hx of DM, A1c 6.5% (1/5) - indicates optimal glycemic control. Medically manages with metformin, glimepiride and Trulicity PTA as per H&P Pt NPO upon admit and ordered for CCHO diet as of last night s/p extubation. Pt sleeping upon multiple visits. No documented GI distress at this time; Last BM 1/5 - bowel regimen ordered (miralax, senna, dulcolax)    No weight history indicated in chart.  Stated dosing weight (1/6) 199.9Ibs, yet bedscale weight (1/6) 206.3Ibs    Pt with Hx of DM, A1c 6.5% (1/5) - indicates optimal glycemic control. Medically manages with metformin, glimepiride and Trulicity PTA as per H&P

## 2022-01-08 LAB
ALBUMIN SERPL ELPH-MCNC: 3.6 G/DL — SIGNIFICANT CHANGE UP (ref 3.3–5)
ALBUMIN SERPL ELPH-MCNC: 3.8 G/DL — SIGNIFICANT CHANGE UP (ref 3.3–5)
ALP SERPL-CCNC: 37 U/L — LOW (ref 40–120)
ALP SERPL-CCNC: 37 U/L — LOW (ref 40–120)
ALT FLD-CCNC: 23 U/L — SIGNIFICANT CHANGE UP (ref 10–45)
ALT FLD-CCNC: 25 U/L — SIGNIFICANT CHANGE UP (ref 10–45)
ANION GAP SERPL CALC-SCNC: 12 MMOL/L — SIGNIFICANT CHANGE UP (ref 5–17)
ANION GAP SERPL CALC-SCNC: 13 MMOL/L — SIGNIFICANT CHANGE UP (ref 5–17)
AST SERPL-CCNC: 21 U/L — SIGNIFICANT CHANGE UP (ref 10–40)
AST SERPL-CCNC: 23 U/L — SIGNIFICANT CHANGE UP (ref 10–40)
BASOPHILS # BLD AUTO: 0.01 K/UL — SIGNIFICANT CHANGE UP (ref 0–0.2)
BASOPHILS NFR BLD AUTO: 0.1 % — SIGNIFICANT CHANGE UP (ref 0–2)
BILIRUB SERPL-MCNC: 0.7 MG/DL — SIGNIFICANT CHANGE UP (ref 0.2–1.2)
BILIRUB SERPL-MCNC: 0.8 MG/DL — SIGNIFICANT CHANGE UP (ref 0.2–1.2)
BUN SERPL-MCNC: 27 MG/DL — HIGH (ref 7–23)
BUN SERPL-MCNC: 29 MG/DL — HIGH (ref 7–23)
CALCIUM SERPL-MCNC: 8.5 MG/DL — SIGNIFICANT CHANGE UP (ref 8.4–10.5)
CALCIUM SERPL-MCNC: 8.9 MG/DL — SIGNIFICANT CHANGE UP (ref 8.4–10.5)
CHLORIDE SERPL-SCNC: 104 MMOL/L — SIGNIFICANT CHANGE UP (ref 96–108)
CHLORIDE SERPL-SCNC: 104 MMOL/L — SIGNIFICANT CHANGE UP (ref 96–108)
CO2 SERPL-SCNC: 21 MMOL/L — LOW (ref 22–31)
CO2 SERPL-SCNC: 22 MMOL/L — SIGNIFICANT CHANGE UP (ref 22–31)
CREAT SERPL-MCNC: 1.14 MG/DL — SIGNIFICANT CHANGE UP (ref 0.5–1.3)
CREAT SERPL-MCNC: 1.22 MG/DL — SIGNIFICANT CHANGE UP (ref 0.5–1.3)
EOSINOPHIL # BLD AUTO: 0.03 K/UL — SIGNIFICANT CHANGE UP (ref 0–0.5)
EOSINOPHIL NFR BLD AUTO: 0.4 % — SIGNIFICANT CHANGE UP (ref 0–6)
GAS PNL BLDA: SIGNIFICANT CHANGE UP
GAS PNL BLDA: SIGNIFICANT CHANGE UP
GLUCOSE SERPL-MCNC: 124 MG/DL — HIGH (ref 70–99)
GLUCOSE SERPL-MCNC: 157 MG/DL — HIGH (ref 70–99)
HCT VFR BLD CALC: 23.7 % — LOW (ref 39–50)
HCT VFR BLD CALC: 28.4 % — LOW (ref 39–50)
HGB BLD-MCNC: 7.8 G/DL — LOW (ref 13–17)
HGB BLD-MCNC: 9.3 G/DL — LOW (ref 13–17)
IMM GRANULOCYTES NFR BLD AUTO: 0.3 % — SIGNIFICANT CHANGE UP (ref 0–1.5)
LYMPHOCYTES # BLD AUTO: 0.8 K/UL — LOW (ref 1–3.3)
LYMPHOCYTES # BLD AUTO: 11 % — LOW (ref 13–44)
MAGNESIUM SERPL-MCNC: 2.5 MG/DL — SIGNIFICANT CHANGE UP (ref 1.6–2.6)
MAGNESIUM SERPL-MCNC: 2.8 MG/DL — HIGH (ref 1.6–2.6)
MCHC RBC-ENTMCNC: 29 PG — SIGNIFICANT CHANGE UP (ref 27–34)
MCHC RBC-ENTMCNC: 29.2 PG — SIGNIFICANT CHANGE UP (ref 27–34)
MCHC RBC-ENTMCNC: 32.7 GM/DL — SIGNIFICANT CHANGE UP (ref 32–36)
MCHC RBC-ENTMCNC: 32.9 GM/DL — SIGNIFICANT CHANGE UP (ref 32–36)
MCV RBC AUTO: 88.1 FL — SIGNIFICANT CHANGE UP (ref 80–100)
MCV RBC AUTO: 89.3 FL — SIGNIFICANT CHANGE UP (ref 80–100)
MONOCYTES # BLD AUTO: 0.77 K/UL — SIGNIFICANT CHANGE UP (ref 0–0.9)
MONOCYTES NFR BLD AUTO: 10.5 % — SIGNIFICANT CHANGE UP (ref 2–14)
NEUTROPHILS # BLD AUTO: 5.67 K/UL — SIGNIFICANT CHANGE UP (ref 1.8–7.4)
NEUTROPHILS NFR BLD AUTO: 77.7 % — HIGH (ref 43–77)
NRBC # BLD: 0 /100 WBCS — SIGNIFICANT CHANGE UP (ref 0–0)
NRBC # BLD: 0 /100 WBCS — SIGNIFICANT CHANGE UP (ref 0–0)
PHOSPHATE SERPL-MCNC: 1.7 MG/DL — LOW (ref 2.5–4.5)
PHOSPHATE SERPL-MCNC: 2.6 MG/DL — SIGNIFICANT CHANGE UP (ref 2.5–4.5)
PLATELET # BLD AUTO: 167 K/UL — SIGNIFICANT CHANGE UP (ref 150–400)
PLATELET # BLD AUTO: 168 K/UL — SIGNIFICANT CHANGE UP (ref 150–400)
POTASSIUM SERPL-MCNC: 4 MMOL/L — SIGNIFICANT CHANGE UP (ref 3.5–5.3)
POTASSIUM SERPL-MCNC: 4.2 MMOL/L — SIGNIFICANT CHANGE UP (ref 3.5–5.3)
POTASSIUM SERPL-SCNC: 4 MMOL/L — SIGNIFICANT CHANGE UP (ref 3.5–5.3)
POTASSIUM SERPL-SCNC: 4.2 MMOL/L — SIGNIFICANT CHANGE UP (ref 3.5–5.3)
PROT SERPL-MCNC: 5.6 G/DL — LOW (ref 6–8.3)
PROT SERPL-MCNC: 6.1 G/DL — SIGNIFICANT CHANGE UP (ref 6–8.3)
RBC # BLD: 2.69 M/UL — LOW (ref 4.2–5.8)
RBC # BLD: 3.18 M/UL — LOW (ref 4.2–5.8)
RBC # FLD: 15.7 % — HIGH (ref 10.3–14.5)
RBC # FLD: 15.8 % — HIGH (ref 10.3–14.5)
SODIUM SERPL-SCNC: 138 MMOL/L — SIGNIFICANT CHANGE UP (ref 135–145)
SODIUM SERPL-SCNC: 138 MMOL/L — SIGNIFICANT CHANGE UP (ref 135–145)
WBC # BLD: 7.3 K/UL — SIGNIFICANT CHANGE UP (ref 3.8–10.5)
WBC # BLD: 9.09 K/UL — SIGNIFICANT CHANGE UP (ref 3.8–10.5)
WBC # FLD AUTO: 7.3 K/UL — SIGNIFICANT CHANGE UP (ref 3.8–10.5)
WBC # FLD AUTO: 9.09 K/UL — SIGNIFICANT CHANGE UP (ref 3.8–10.5)

## 2022-01-08 PROCEDURE — 71045 X-RAY EXAM CHEST 1 VIEW: CPT | Mod: 26,76

## 2022-01-08 PROCEDURE — 93010 ELECTROCARDIOGRAM REPORT: CPT

## 2022-01-08 PROCEDURE — 99291 CRITICAL CARE FIRST HOUR: CPT

## 2022-01-08 RX ORDER — METOPROLOL TARTRATE 50 MG
50 TABLET ORAL EVERY 8 HOURS
Refills: 0 | Status: DISCONTINUED | OUTPATIENT
Start: 2022-01-08 | End: 2022-01-09

## 2022-01-08 RX ORDER — METOPROLOL TARTRATE 50 MG
25 TABLET ORAL EVERY 8 HOURS
Refills: 0 | Status: DISCONTINUED | OUTPATIENT
Start: 2022-01-08 | End: 2022-01-08

## 2022-01-08 RX ORDER — AMIODARONE HYDROCHLORIDE 400 MG/1
TABLET ORAL
Refills: 0 | Status: DISCONTINUED | OUTPATIENT
Start: 2022-01-08 | End: 2022-01-11

## 2022-01-08 RX ORDER — INSULIN HUMAN 100 [IU]/ML
3 INJECTION, SOLUTION SUBCUTANEOUS
Qty: 100 | Refills: 0 | Status: DISCONTINUED | OUTPATIENT
Start: 2022-01-08 | End: 2022-01-09

## 2022-01-08 RX ORDER — AMIODARONE HYDROCHLORIDE 400 MG/1
200 TABLET ORAL DAILY
Refills: 0 | Status: DISCONTINUED | OUTPATIENT
Start: 2022-01-12 | End: 2022-01-11

## 2022-01-08 RX ORDER — INSULIN LISPRO 100/ML
VIAL (ML) SUBCUTANEOUS
Refills: 0 | Status: DISCONTINUED | OUTPATIENT
Start: 2022-01-08 | End: 2022-01-11

## 2022-01-08 RX ORDER — POTASSIUM CHLORIDE 20 MEQ
40 PACKET (EA) ORAL DAILY
Refills: 0 | Status: COMPLETED | OUTPATIENT
Start: 2022-01-08 | End: 2022-01-11

## 2022-01-08 RX ORDER — INSULIN LISPRO 100/ML
VIAL (ML) SUBCUTANEOUS AT BEDTIME
Refills: 0 | Status: DISCONTINUED | OUTPATIENT
Start: 2022-01-09 | End: 2022-01-11

## 2022-01-08 RX ORDER — METOPROLOL TARTRATE 50 MG
12.5 TABLET ORAL ONCE
Refills: 0 | Status: COMPLETED | OUTPATIENT
Start: 2022-01-08 | End: 2022-01-08

## 2022-01-08 RX ORDER — INSULIN GLARGINE 100 [IU]/ML
12 INJECTION, SOLUTION SUBCUTANEOUS AT BEDTIME
Refills: 0 | Status: DISCONTINUED | OUTPATIENT
Start: 2022-01-08 | End: 2022-01-08

## 2022-01-08 RX ORDER — AMIODARONE HYDROCHLORIDE 400 MG/1
400 TABLET ORAL EVERY 8 HOURS
Refills: 0 | Status: DISCONTINUED | OUTPATIENT
Start: 2022-01-08 | End: 2022-01-11

## 2022-01-08 RX ORDER — MAGNESIUM SULFATE 500 MG/ML
2 VIAL (ML) INJECTION ONCE
Refills: 0 | Status: COMPLETED | OUTPATIENT
Start: 2022-01-08 | End: 2022-01-08

## 2022-01-08 RX ORDER — INSULIN GLARGINE 100 [IU]/ML
40 INJECTION, SOLUTION SUBCUTANEOUS AT BEDTIME
Refills: 0 | Status: DISCONTINUED | OUTPATIENT
Start: 2022-01-08 | End: 2022-01-08

## 2022-01-08 RX ORDER — HUMAN INSULIN 100 [IU]/ML
9 INJECTION, SUSPENSION SUBCUTANEOUS ONCE
Refills: 0 | Status: COMPLETED | OUTPATIENT
Start: 2022-01-08 | End: 2022-01-08

## 2022-01-08 RX ORDER — METOPROLOL TARTRATE 50 MG
50 TABLET ORAL EVERY 8 HOURS
Refills: 0 | Status: DISCONTINUED | OUTPATIENT
Start: 2022-01-08 | End: 2022-01-08

## 2022-01-08 RX ORDER — INSULIN LISPRO 100/ML
8 VIAL (ML) SUBCUTANEOUS
Refills: 0 | Status: DISCONTINUED | OUTPATIENT
Start: 2022-01-08 | End: 2022-01-11

## 2022-01-08 RX ORDER — FUROSEMIDE 40 MG
40 TABLET ORAL DAILY
Refills: 0 | Status: COMPLETED | OUTPATIENT
Start: 2022-01-08 | End: 2022-01-11

## 2022-01-08 RX ORDER — INSULIN GLARGINE 100 [IU]/ML
24 INJECTION, SOLUTION SUBCUTANEOUS AT BEDTIME
Refills: 0 | Status: DISCONTINUED | OUTPATIENT
Start: 2022-01-08 | End: 2022-01-11

## 2022-01-08 RX ORDER — METFORMIN HYDROCHLORIDE 850 MG/1
1000 TABLET ORAL
Refills: 0 | Status: DISCONTINUED | OUTPATIENT
Start: 2022-01-08 | End: 2022-01-11

## 2022-01-08 RX ADMIN — AMIODARONE HYDROCHLORIDE 400 MILLIGRAM(S): 400 TABLET ORAL at 06:16

## 2022-01-08 RX ADMIN — Medication 50 MILLIGRAM(S): at 20:26

## 2022-01-08 RX ADMIN — HUMAN INSULIN 9 UNIT(S): 100 INJECTION, SUSPENSION SUBCUTANEOUS at 10:39

## 2022-01-08 RX ADMIN — Medication 50 MILLIGRAM(S): at 12:36

## 2022-01-08 RX ADMIN — Medication 25 GRAM(S): at 08:27

## 2022-01-08 RX ADMIN — ENOXAPARIN SODIUM 40 MILLIGRAM(S): 100 INJECTION SUBCUTANEOUS at 12:37

## 2022-01-08 RX ADMIN — CHLORHEXIDINE GLUCONATE 1 APPLICATION(S): 213 SOLUTION TOPICAL at 12:40

## 2022-01-08 RX ADMIN — Medication 650 MILLIGRAM(S): at 14:22

## 2022-01-08 RX ADMIN — Medication 500 MILLIGRAM(S): at 17:50

## 2022-01-08 RX ADMIN — GABAPENTIN 100 MILLIGRAM(S): 400 CAPSULE ORAL at 14:21

## 2022-01-08 RX ADMIN — Medication 40 MILLIGRAM(S): at 14:21

## 2022-01-08 RX ADMIN — METFORMIN HYDROCHLORIDE 1000 MILLIGRAM(S): 850 TABLET ORAL at 17:50

## 2022-01-08 RX ADMIN — INSULIN HUMAN 3 UNIT(S)/HR: 100 INJECTION, SOLUTION SUBCUTANEOUS at 06:01

## 2022-01-08 RX ADMIN — GABAPENTIN 100 MILLIGRAM(S): 400 CAPSULE ORAL at 05:05

## 2022-01-08 RX ADMIN — Medication 650 MILLIGRAM(S): at 03:07

## 2022-01-08 RX ADMIN — Medication 650 MILLIGRAM(S): at 14:24

## 2022-01-08 RX ADMIN — Medication 83.33 MILLIMOLE(S): at 15:12

## 2022-01-08 RX ADMIN — Medication 650 MILLIGRAM(S): at 09:45

## 2022-01-08 RX ADMIN — GABAPENTIN 100 MILLIGRAM(S): 400 CAPSULE ORAL at 21:20

## 2022-01-08 RX ADMIN — ATORVASTATIN CALCIUM 80 MILLIGRAM(S): 80 TABLET, FILM COATED ORAL at 21:24

## 2022-01-08 RX ADMIN — Medication 12.5 MILLIGRAM(S): at 08:27

## 2022-01-08 RX ADMIN — INSULIN GLARGINE 24 UNIT(S): 100 INJECTION, SOLUTION SUBCUTANEOUS at 21:21

## 2022-01-08 RX ADMIN — Medication 10 MILLIGRAM(S): at 17:51

## 2022-01-08 RX ADMIN — Medication 10 MILLIGRAM(S): at 01:51

## 2022-01-08 RX ADMIN — SENNA PLUS 2 TABLET(S): 8.6 TABLET ORAL at 21:20

## 2022-01-08 RX ADMIN — Medication 650 MILLIGRAM(S): at 20:52

## 2022-01-08 RX ADMIN — Medication 650 MILLIGRAM(S): at 20:22

## 2022-01-08 RX ADMIN — Medication 650 MILLIGRAM(S): at 08:26

## 2022-01-08 RX ADMIN — Medication 10 MILLIGRAM(S): at 10:40

## 2022-01-08 RX ADMIN — Medication 1 TABLET(S): at 12:36

## 2022-01-08 RX ADMIN — Medication 500 MILLIGRAM(S): at 05:05

## 2022-01-08 RX ADMIN — AMIODARONE HYDROCHLORIDE 400 MILLIGRAM(S): 400 TABLET ORAL at 21:21

## 2022-01-08 RX ADMIN — Medication 650 MILLIGRAM(S): at 03:00

## 2022-01-08 RX ADMIN — POLYETHYLENE GLYCOL 3350 17 GRAM(S): 17 POWDER, FOR SOLUTION ORAL at 12:36

## 2022-01-08 RX ADMIN — INSULIN HUMAN 3 UNIT(S)/HR: 100 INJECTION, SOLUTION SUBCUTANEOUS at 19:24

## 2022-01-08 RX ADMIN — PANTOPRAZOLE SODIUM 40 MILLIGRAM(S): 20 TABLET, DELAYED RELEASE ORAL at 06:05

## 2022-01-08 RX ADMIN — Medication 325 MILLIGRAM(S): at 12:36

## 2022-01-08 RX ADMIN — Medication 40 MILLIEQUIVALENT(S): at 15:09

## 2022-01-08 RX ADMIN — Medication 12.5 MILLIGRAM(S): at 05:05

## 2022-01-08 RX ADMIN — AMIODARONE HYDROCHLORIDE 400 MILLIGRAM(S): 400 TABLET ORAL at 14:21

## 2022-01-08 NOTE — PROGRESS NOTE ADULT - SUBJECTIVE AND OBJECTIVE BOX
CARDIOLOGY FOLLOW UP - Dr. Masters  Date of Service: 1/8/22  CC: new onset afib noted  no cp/sob  c.o pain at incision site     Review of Systems:  Constitutional: No fever, weight loss, or fatigue  Respiratory: No cough, wheezing, or hemoptysis, no shortness of breath  Cardiovascular: No chest pain, palpitations, passing out, dizziness, or leg swelling  Gastrointestinal: No abd or epigastric pain.  No nausea, vomiting, or hematemesis; no diarrhea or constipation, no melena or hematochezia  Vascular: no edema       PHYSICAL EXAM:  T(C): 36.5 (01-08-22 @ 08:00), Max: 37.7 (01-08-22 @ 00:00)  HR: 105 (01-08-22 @ 09:00) (82 - 112)  BP: 98/55 (01-08-22 @ 09:00) (85/52 - 121/61)  RR: 26 (01-08-22 @ 09:00) (13 - 44)  SpO2: 97% (01-08-22 @ 09:00) (94% - 100%)  Wt(kg): --  I&O's Summary    07 Jan 2022 07:01  -  08 Jan 2022 07:00  --------------------------------------------------------  IN: 1904.7 mL / OUT: 2180 mL / NET: -275.3 mL    08 Jan 2022 07:01  -  08 Jan 2022 09:41  --------------------------------------------------------  IN: 79 mL / OUT: 30 mL / NET: 49 mL        Appearance: Normal	  Cardiovascular: irreg  No JVD, No murmurs  Respiratory: Lungs clear to auscultation	  Gastrointestinal:  Soft, Non-tender, + BS	  Extremities: Normal range of motion, No clubbing, cyanosis or edema      Home Medications:  Aspirin Enteric Coated 81 mg oral delayed release tablet: 1 tab(s) orally once a day (05 Jan 2022 11:36)  atorvastatin 40 mg oral tablet: 1 tab(s) orally once a day (05 Jan 2022 11:36)  glimepiride: patient unsure of dose (05 Jan 2022 11:36)  metFORMIN 1000 mg oral tablet: 1 tab(s) orally 2 times a day (05 Jan 2022 11:36)  olmesartan-hydrochlorothiazide 40 mg-25 mg oral tablet: 1 tab(s) orally once a day (05 Jan 2022 11:36)  Trulicity Pen 0.75 mg/0.5 mL subcutaneous solution: once a week on Saturday (05 Jan 2022 11:36)  Vitamin B-12: 3000 milligram(s) orally once a day (05 Jan 2022 11:36)  Vitamin D3 50 mcg (2000 intl units) oral capsule: 1 cap(s) orally once a day (05 Jan 2022 11:36)      MEDICATIONS  (STANDING):  acetaminophen     Tablet .. 650 milliGRAM(s) Oral every 6 hours  aMIOdarone    Tablet 400 milliGRAM(s) Oral every 8 hours  aMIOdarone    Tablet   Oral   ascorbic acid 500 milliGRAM(s) Oral two times a day  aspirin enteric coated 325 milliGRAM(s) Oral daily  atorvastatin 80 milliGRAM(s) Oral at bedtime  bisacodyl Suppository 10 milliGRAM(s) Rectal once  chlorhexidine 2% Cloths 1 Application(s) Topical daily  dextrose 40% Gel 15 Gram(s) Oral once  dextrose 5%. 1000 milliLiter(s) (50 mL/Hr) IV Continuous <Continuous>  dextrose 50% Injectable 50 milliLiter(s) IV Push every 15 minutes  enoxaparin Injectable 40 milliGRAM(s) SubCutaneous daily  gabapentin 100 milliGRAM(s) Oral every 8 hours  glucagon  Injectable 1 milliGRAM(s) IntraMuscular once  insulin glargine Injectable (LANTUS) 12 Unit(s) SubCutaneous at bedtime  insulin regular Infusion 3 Unit(s)/Hr (3 mL/Hr) IV Continuous <Continuous>  metFORMIN 1000 milliGRAM(s) Oral two times a day  metoclopramide Injectable 10 milliGRAM(s) IV Push every 8 hours  metoprolol tartrate 25 milliGRAM(s) Oral every 8 hours  multivitamin 1 Tablet(s) Oral daily  pantoprazole    Tablet 40 milliGRAM(s) Oral before breakfast  polyethylene glycol 3350 17 Gram(s) Oral daily  senna 2 Tablet(s) Oral at bedtime  sodium chloride 0.9%. 1000 milliLiter(s) (10 mL/Hr) IV Continuous <Continuous>      TELEMETRY: 	afib 90-120s     ECG:  	  RADIOLOGY:    DIAGNOSTIC TESTING:  [ ] Echocardiogram:  [ ]  Catheterization:  [ ] Stress Test:    OTHER: 	    LABS:	 	    Troponin T, High Sensitivity Result: 337 ng/L [0 - 51] (01-07 @ 02:03)  Creatine Kinase, Serum: 358 U/L [30 - 200] (01-07 @ 02:03)  CKMB Units: 11.1 ng/mL [0.0 - 6.7] (01-07 @ 02:03)  Creatine Kinase, Serum: 290 U/L [30 - 200] (01-06 @ 14:29)  CKMB Units: 16.8 ng/mL [0.0 - 6.7] (01-06 @ 14:29)  Troponin T, High Sensitivity Result: 223 ng/L [0 - 51] (01-06 @ 14:29)                          7.8    7.30  )-----------( 167      ( 08 Jan 2022 00:34 )             23.7     01-08    138  |  104  |  29<H>  ----------------------------<  124<H>  4.0   |  21<L>  |  1.22    Ca    8.5      08 Jan 2022 00:32  Phos  2.6     01-08  Mg     2.5     01-08    TPro  5.6<L>  /  Alb  3.6  /  TBili  0.7  /  DBili  x   /  AST  23  /  ALT  23  /  AlkPhos  37<L>  01-08    PT/INR - ( 07 Jan 2022 02:03 )   PT: 13.3 sec;   INR: 1.11 ratio         PTT - ( 07 Jan 2022 02:03 )  PTT:26.2 sec

## 2022-01-08 NOTE — PROGRESS NOTE ADULT - SUBJECTIVE AND OBJECTIVE BOX
DATE OF SERVICE: 01-08-22 @ 08:52    Patient is a 76y old  Male who presents with a chief complaint of CABG (07 Jan 2022 07:34)      SUBJECTIVE / OVERNIGHT EVENTS:  No chest pain. No shortness of breath. No complaints. No events overnight.     MEDICATIONS  (STANDING):  acetaminophen     Tablet .. 650 milliGRAM(s) Oral every 6 hours  aMIOdarone    Tablet 400 milliGRAM(s) Oral every 8 hours  aMIOdarone    Tablet   Oral   ascorbic acid 500 milliGRAM(s) Oral two times a day  aspirin enteric coated 325 milliGRAM(s) Oral daily  atorvastatin 80 milliGRAM(s) Oral at bedtime  bisacodyl Suppository 10 milliGRAM(s) Rectal once  chlorhexidine 2% Cloths 1 Application(s) Topical daily  dextrose 40% Gel 15 Gram(s) Oral once  dextrose 5%. 1000 milliLiter(s) (50 mL/Hr) IV Continuous <Continuous>  dextrose 50% Injectable 50 milliLiter(s) IV Push every 15 minutes  enoxaparin Injectable 40 milliGRAM(s) SubCutaneous daily  gabapentin 100 milliGRAM(s) Oral every 8 hours  glucagon  Injectable 1 milliGRAM(s) IntraMuscular once  insulin glargine Injectable (LANTUS) 12 Unit(s) SubCutaneous at bedtime  insulin regular Infusion 3 Unit(s)/Hr (3 mL/Hr) IV Continuous <Continuous>  metFORMIN 1000 milliGRAM(s) Oral two times a day  metoclopramide Injectable 10 milliGRAM(s) IV Push every 8 hours  metoprolol tartrate 25 milliGRAM(s) Oral every 8 hours  multivitamin 1 Tablet(s) Oral daily  pantoprazole    Tablet 40 milliGRAM(s) Oral before breakfast  polyethylene glycol 3350 17 Gram(s) Oral daily  senna 2 Tablet(s) Oral at bedtime  sodium chloride 0.9%. 1000 milliLiter(s) (10 mL/Hr) IV Continuous <Continuous>    MEDICATIONS  (PRN):  HYDROmorphone  Injectable 0.5 milliGRAM(s) IV Push every 6 hours PRN Severe Pain (7 - 10)  oxyCODONE    IR 5 milliGRAM(s) Oral every 4 hours PRN Moderate Pain (4 - 6)  oxyCODONE    IR 10 milliGRAM(s) Oral every 4 hours PRN Severe Pain (7 - 10)      Vital Signs Last 24 Hrs  T(C): 37.3 (08 Jan 2022 04:00), Max: 37.7 (08 Jan 2022 00:00)  T(F): 99.1 (08 Jan 2022 04:00), Max: 99.9 (08 Jan 2022 00:00)  HR: 94 (08 Jan 2022 07:00) (82 - 109)  BP: 93/55 (08 Jan 2022 07:00) (85/52 - 121/61)  BP(mean): 70 (08 Jan 2022 07:00) (65 - 85)  RR: 25 (08 Jan 2022 07:00) (13 - 44)  SpO2: 96% (08 Jan 2022 07:00) (94% - 100%)  CAPILLARY BLOOD GLUCOSE  194 (07 Jan 2022 19:00)  166 (07 Jan 2022 18:00)  138 (07 Jan 2022 17:00)  154 (07 Jan 2022 16:00)  184 (07 Jan 2022 14:00)  198 (07 Jan 2022 13:00)  226 (07 Jan 2022 12:00)  226 (07 Jan 2022 11:00)  232 (07 Jan 2022 10:00)      POCT Blood Glucose.: 205 mg/dL (08 Jan 2022 08:10)  POCT Blood Glucose.: 169 mg/dL (08 Jan 2022 06:47)  POCT Blood Glucose.: 177 mg/dL (08 Jan 2022 05:46)  POCT Blood Glucose.: 161 mg/dL (08 Jan 2022 04:17)  POCT Blood Glucose.: 146 mg/dL (08 Jan 2022 01:46)  POCT Blood Glucose.: 124 mg/dL (08 Jan 2022 00:06)  POCT Blood Glucose.: 100 mg/dL (07 Jan 2022 22:46)  POCT Blood Glucose.: 119 mg/dL (07 Jan 2022 21:50)  POCT Blood Glucose.: 143 mg/dL (07 Jan 2022 21:00)  POCT Blood Glucose.: 154 mg/dL (07 Jan 2022 19:52)  POCT Blood Glucose.: 194 mg/dL (07 Jan 2022 18:48)  POCT Blood Glucose.: 166 mg/dL (07 Jan 2022 17:53)  POCT Blood Glucose.: 138 mg/dL (07 Jan 2022 16:52)  POCT Blood Glucose.: 154 mg/dL (07 Jan 2022 16:08)  POCT Blood Glucose.: 178 mg/dL (07 Jan 2022 15:05)  POCT Blood Glucose.: 184 mg/dL (07 Jan 2022 13:55)  POCT Blood Glucose.: 198 mg/dL (07 Jan 2022 13:24)  POCT Blood Glucose.: 226 mg/dL (07 Jan 2022 11:58)  POCT Blood Glucose.: 226 mg/dL (07 Jan 2022 10:49)  POCT Blood Glucose.: 232 mg/dL (07 Jan 2022 09:44)    I&O's Summary    07 Jan 2022 07:01  -  08 Jan 2022 07:00  --------------------------------------------------------  IN: 1904.7 mL / OUT: 2180 mL / NET: -275.3 mL        PHYSICAL EXAM:  GENERAL: NAD, well-developed  HEAD:  Atraumatic, Normocephalic  EYES: EOMI, PERRLA, conjunctiva and sclera clear  NECK: Supple, No JVD  CHEST/LUNG: Clear to auscultation bilaterally; No wheeze  HEART: Regular rate and rhythm; No murmurs, rubs, or gallops  ABDOMEN: Soft, Nontender, Nondistended; Bowel sounds present  EXTREMITIES:  2+ Peripheral Pulses, No clubbing, cyanosis, or edema  PSYCH: AAOx3  NEUROLOGY: non-focal  SKIN: No rashes or lesions    LABS:                        7.8    7.30  )-----------( 167      ( 08 Jan 2022 00:34 )             23.7     01-08    138  |  104  |  29<H>  ----------------------------<  124<H>  4.0   |  21<L>  |  1.22    Ca    8.5      08 Jan 2022 00:32  Phos  2.6     01-08  Mg     2.5     01-08    TPro  5.6<L>  /  Alb  3.6  /  TBili  0.7  /  DBili  x   /  AST  23  /  ALT  23  /  AlkPhos  37<L>  01-08    PT/INR - ( 07 Jan 2022 02:03 )   PT: 13.3 sec;   INR: 1.11 ratio         PTT - ( 07 Jan 2022 02:03 )  PTT:26.2 sec  CARDIAC MARKERS ( 07 Jan 2022 02:03 )  x     / x     / 358 U/L / x     / 11.1 ng/mL  CARDIAC MARKERS ( 06 Jan 2022 14:29 )  x     / x     / 290 U/L / x     / 16.8 ng/mL          RADIOLOGY & ADDITIONAL TESTS:    Imaging Personally Reviewed:    Consultant(s) Notes Reviewed:      Care Discussed with Consultants/Other Providers:

## 2022-01-09 DIAGNOSIS — I48.91 UNSPECIFIED ATRIAL FIBRILLATION: ICD-10-CM

## 2022-01-09 DIAGNOSIS — Z95.1 PRESENCE OF AORTOCORONARY BYPASS GRAFT: ICD-10-CM

## 2022-01-09 DIAGNOSIS — E11.9 TYPE 2 DIABETES MELLITUS WITHOUT COMPLICATIONS: ICD-10-CM

## 2022-01-09 DIAGNOSIS — I10 ESSENTIAL (PRIMARY) HYPERTENSION: ICD-10-CM

## 2022-01-09 LAB
ALBUMIN SERPL ELPH-MCNC: 3.8 G/DL — SIGNIFICANT CHANGE UP (ref 3.3–5)
ALP SERPL-CCNC: 42 U/L — SIGNIFICANT CHANGE UP (ref 40–120)
ALT FLD-CCNC: 27 U/L — SIGNIFICANT CHANGE UP (ref 10–45)
ANION GAP SERPL CALC-SCNC: 10 MMOL/L — SIGNIFICANT CHANGE UP (ref 5–17)
AST SERPL-CCNC: 20 U/L — SIGNIFICANT CHANGE UP (ref 10–40)
BASOPHILS # BLD AUTO: 0.02 K/UL — SIGNIFICANT CHANGE UP (ref 0–0.2)
BASOPHILS NFR BLD AUTO: 0.2 % — SIGNIFICANT CHANGE UP (ref 0–2)
BILIRUB SERPL-MCNC: 0.7 MG/DL — SIGNIFICANT CHANGE UP (ref 0.2–1.2)
BLD GP AB SCN SERPL QL: NEGATIVE — SIGNIFICANT CHANGE UP
BUN SERPL-MCNC: 34 MG/DL — HIGH (ref 7–23)
CALCIUM SERPL-MCNC: 8.6 MG/DL — SIGNIFICANT CHANGE UP (ref 8.4–10.5)
CHLORIDE SERPL-SCNC: 104 MMOL/L — SIGNIFICANT CHANGE UP (ref 96–108)
CO2 SERPL-SCNC: 23 MMOL/L — SIGNIFICANT CHANGE UP (ref 22–31)
CREAT SERPL-MCNC: 1.38 MG/DL — HIGH (ref 0.5–1.3)
EOSINOPHIL # BLD AUTO: 0.07 K/UL — SIGNIFICANT CHANGE UP (ref 0–0.5)
EOSINOPHIL NFR BLD AUTO: 0.7 % — SIGNIFICANT CHANGE UP (ref 0–6)
GLUCOSE SERPL-MCNC: 130 MG/DL — HIGH (ref 70–99)
HCT VFR BLD CALC: 29.1 % — LOW (ref 39–50)
HGB BLD-MCNC: 9.3 G/DL — LOW (ref 13–17)
IMM GRANULOCYTES NFR BLD AUTO: 0.5 % — SIGNIFICANT CHANGE UP (ref 0–1.5)
LYMPHOCYTES # BLD AUTO: 1.01 K/UL — SIGNIFICANT CHANGE UP (ref 1–3.3)
LYMPHOCYTES # BLD AUTO: 10.3 % — LOW (ref 13–44)
MAGNESIUM SERPL-MCNC: 2.4 MG/DL — SIGNIFICANT CHANGE UP (ref 1.6–2.6)
MCHC RBC-ENTMCNC: 28.3 PG — SIGNIFICANT CHANGE UP (ref 27–34)
MCHC RBC-ENTMCNC: 32 GM/DL — SIGNIFICANT CHANGE UP (ref 32–36)
MCV RBC AUTO: 88.4 FL — SIGNIFICANT CHANGE UP (ref 80–100)
MONOCYTES # BLD AUTO: 0.88 K/UL — SIGNIFICANT CHANGE UP (ref 0–0.9)
MONOCYTES NFR BLD AUTO: 8.9 % — SIGNIFICANT CHANGE UP (ref 2–14)
NEUTROPHILS # BLD AUTO: 7.82 K/UL — HIGH (ref 1.8–7.4)
NEUTROPHILS NFR BLD AUTO: 79.4 % — HIGH (ref 43–77)
NRBC # BLD: 0 /100 WBCS — SIGNIFICANT CHANGE UP (ref 0–0)
PHOSPHATE SERPL-MCNC: 3 MG/DL — SIGNIFICANT CHANGE UP (ref 2.5–4.5)
PLATELET # BLD AUTO: 192 K/UL — SIGNIFICANT CHANGE UP (ref 150–400)
POTASSIUM SERPL-MCNC: 4.4 MMOL/L — SIGNIFICANT CHANGE UP (ref 3.5–5.3)
POTASSIUM SERPL-SCNC: 4.4 MMOL/L — SIGNIFICANT CHANGE UP (ref 3.5–5.3)
PROT SERPL-MCNC: 6.2 G/DL — SIGNIFICANT CHANGE UP (ref 6–8.3)
RBC # BLD: 3.29 M/UL — LOW (ref 4.2–5.8)
RBC # FLD: 15.8 % — HIGH (ref 10.3–14.5)
RH IG SCN BLD-IMP: NEGATIVE — SIGNIFICANT CHANGE UP
SODIUM SERPL-SCNC: 137 MMOL/L — SIGNIFICANT CHANGE UP (ref 135–145)
WBC # BLD: 9.85 K/UL — SIGNIFICANT CHANGE UP (ref 3.8–10.5)
WBC # FLD AUTO: 9.85 K/UL — SIGNIFICANT CHANGE UP (ref 3.8–10.5)

## 2022-01-09 PROCEDURE — 99232 SBSQ HOSP IP/OBS MODERATE 35: CPT

## 2022-01-09 PROCEDURE — 71045 X-RAY EXAM CHEST 1 VIEW: CPT | Mod: 26

## 2022-01-09 RX ORDER — METOPROLOL TARTRATE 50 MG
25 TABLET ORAL ONCE
Refills: 0 | Status: COMPLETED | OUTPATIENT
Start: 2022-01-09 | End: 2022-01-09

## 2022-01-09 RX ORDER — METOPROLOL TARTRATE 50 MG
25 TABLET ORAL ONCE
Refills: 0 | Status: DISCONTINUED | OUTPATIENT
Start: 2022-01-09 | End: 2022-01-09

## 2022-01-09 RX ORDER — METOPROLOL TARTRATE 50 MG
50 TABLET ORAL EVERY 12 HOURS
Refills: 0 | Status: DISCONTINUED | OUTPATIENT
Start: 2022-01-09 | End: 2022-01-10

## 2022-01-09 RX ORDER — METOPROLOL TARTRATE 50 MG
25 TABLET ORAL EVERY 8 HOURS
Refills: 0 | Status: DISCONTINUED | OUTPATIENT
Start: 2022-01-09 | End: 2022-01-09

## 2022-01-09 RX ORDER — SODIUM CHLORIDE 9 MG/ML
3 INJECTION INTRAMUSCULAR; INTRAVENOUS; SUBCUTANEOUS EVERY 8 HOURS
Refills: 0 | Status: DISCONTINUED | OUTPATIENT
Start: 2022-01-09 | End: 2022-01-11

## 2022-01-09 RX ADMIN — SODIUM CHLORIDE 3 MILLILITER(S): 9 INJECTION INTRAMUSCULAR; INTRAVENOUS; SUBCUTANEOUS at 21:34

## 2022-01-09 RX ADMIN — ENOXAPARIN SODIUM 40 MILLIGRAM(S): 100 INJECTION SUBCUTANEOUS at 12:21

## 2022-01-09 RX ADMIN — AMIODARONE HYDROCHLORIDE 400 MILLIGRAM(S): 400 TABLET ORAL at 22:03

## 2022-01-09 RX ADMIN — SODIUM CHLORIDE 3 MILLILITER(S): 9 INJECTION INTRAMUSCULAR; INTRAVENOUS; SUBCUTANEOUS at 14:14

## 2022-01-09 RX ADMIN — Medication 1 TABLET(S): at 12:22

## 2022-01-09 RX ADMIN — Medication 50 MILLIGRAM(S): at 17:08

## 2022-01-09 RX ADMIN — Medication 8 UNIT(S): at 08:00

## 2022-01-09 RX ADMIN — Medication 500 MILLIGRAM(S): at 05:08

## 2022-01-09 RX ADMIN — PANTOPRAZOLE SODIUM 40 MILLIGRAM(S): 20 TABLET, DELAYED RELEASE ORAL at 06:20

## 2022-01-09 RX ADMIN — GABAPENTIN 100 MILLIGRAM(S): 400 CAPSULE ORAL at 14:19

## 2022-01-09 RX ADMIN — POLYETHYLENE GLYCOL 3350 17 GRAM(S): 17 POWDER, FOR SOLUTION ORAL at 12:21

## 2022-01-09 RX ADMIN — INSULIN GLARGINE 24 UNIT(S): 100 INJECTION, SOLUTION SUBCUTANEOUS at 22:03

## 2022-01-09 RX ADMIN — Medication 40 MILLIGRAM(S): at 05:13

## 2022-01-09 RX ADMIN — Medication 650 MILLIGRAM(S): at 09:01

## 2022-01-09 RX ADMIN — Medication 40 MILLIEQUIVALENT(S): at 12:22

## 2022-01-09 RX ADMIN — Medication 25 MILLIGRAM(S): at 04:32

## 2022-01-09 RX ADMIN — Medication 650 MILLIGRAM(S): at 03:15

## 2022-01-09 RX ADMIN — ATORVASTATIN CALCIUM 80 MILLIGRAM(S): 80 TABLET, FILM COATED ORAL at 22:03

## 2022-01-09 RX ADMIN — Medication 8 UNIT(S): at 17:07

## 2022-01-09 RX ADMIN — GABAPENTIN 100 MILLIGRAM(S): 400 CAPSULE ORAL at 22:03

## 2022-01-09 RX ADMIN — Medication 500 MILLIGRAM(S): at 17:08

## 2022-01-09 RX ADMIN — AMIODARONE HYDROCHLORIDE 400 MILLIGRAM(S): 400 TABLET ORAL at 05:08

## 2022-01-09 RX ADMIN — SENNA PLUS 2 TABLET(S): 8.6 TABLET ORAL at 22:03

## 2022-01-09 RX ADMIN — Medication 2: at 08:02

## 2022-01-09 RX ADMIN — Medication 8 UNIT(S): at 12:20

## 2022-01-09 RX ADMIN — Medication 25 MILLIGRAM(S): at 10:37

## 2022-01-09 RX ADMIN — AMIODARONE HYDROCHLORIDE 400 MILLIGRAM(S): 400 TABLET ORAL at 14:19

## 2022-01-09 RX ADMIN — Medication 325 MILLIGRAM(S): at 12:21

## 2022-01-09 RX ADMIN — GABAPENTIN 100 MILLIGRAM(S): 400 CAPSULE ORAL at 05:09

## 2022-01-09 RX ADMIN — Medication 650 MILLIGRAM(S): at 02:45

## 2022-01-09 RX ADMIN — Medication 10 MILLIGRAM(S): at 02:46

## 2022-01-09 RX ADMIN — METFORMIN HYDROCHLORIDE 1000 MILLIGRAM(S): 850 TABLET ORAL at 05:09

## 2022-01-09 RX ADMIN — METFORMIN HYDROCHLORIDE 1000 MILLIGRAM(S): 850 TABLET ORAL at 17:08

## 2022-01-09 NOTE — PROGRESS NOTE ADULT - SUBJECTIVE AND OBJECTIVE BOX
DATE OF SERVICE: 01-09-22 @ 13:25    Patient is a 76y old  Male who presents with a chief complaint of C3L (09 Jan 2022 07:40)      SUBJECTIVE / OVERNIGHT EVENTS:  No chest pain. No shortness of breath. No complaints. No events overnight. has no had BM for over 3 days.    MEDICATIONS  (STANDING):  aMIOdarone    Tablet 400 milliGRAM(s) Oral every 8 hours  aMIOdarone    Tablet   Oral   ascorbic acid 500 milliGRAM(s) Oral two times a day  aspirin enteric coated 325 milliGRAM(s) Oral daily  atorvastatin 80 milliGRAM(s) Oral at bedtime  bisacodyl Suppository 10 milliGRAM(s) Rectal once  dextrose 40% Gel 15 Gram(s) Oral once  dextrose 5%. 1000 milliLiter(s) (50 mL/Hr) IV Continuous <Continuous>  dextrose 50% Injectable 50 milliLiter(s) IV Push every 15 minutes  enoxaparin Injectable 40 milliGRAM(s) SubCutaneous daily  furosemide    Tablet 40 milliGRAM(s) Oral daily  gabapentin 100 milliGRAM(s) Oral every 8 hours  glucagon  Injectable 1 milliGRAM(s) IntraMuscular once  insulin glargine Injectable (LANTUS) 24 Unit(s) SubCutaneous at bedtime  insulin lispro (ADMELOG) corrective regimen sliding scale   SubCutaneous three times a day before meals  insulin lispro (ADMELOG) corrective regimen sliding scale   SubCutaneous at bedtime  insulin lispro Injectable (ADMELOG) 8 Unit(s) SubCutaneous three times a day before meals  metFORMIN 1000 milliGRAM(s) Oral two times a day  metoprolol tartrate 50 milliGRAM(s) Oral every 12 hours  multivitamin 1 Tablet(s) Oral daily  pantoprazole    Tablet 40 milliGRAM(s) Oral before breakfast  polyethylene glycol 3350 17 Gram(s) Oral daily  potassium chloride    Tablet ER 40 milliEquivalent(s) Oral daily  senna 2 Tablet(s) Oral at bedtime  sodium chloride 0.9% lock flush 3 milliLiter(s) IV Push every 8 hours    MEDICATIONS  (PRN):  acetaminophen     Tablet .. 650 milliGRAM(s) Oral every 6 hours PRN Mild Pain (1 - 3)  oxyCODONE    IR 5 milliGRAM(s) Oral every 4 hours PRN Moderate Pain (4 - 6)  oxyCODONE    IR 10 milliGRAM(s) Oral every 4 hours PRN Severe Pain (7 - 10)      Vital Signs Last 24 Hrs  T(C): 36.2 (09 Jan 2022 08:00), Max: 37.3 (09 Jan 2022 00:00)  T(F): 97.1 (09 Jan 2022 08:00), Max: 99.1 (09 Jan 2022 00:00)  HR: 87 (09 Jan 2022 09:00) (77 - 88)  BP: 115/56 (09 Jan 2022 09:00) (91/61 - 123/60)  BP(mean): 81 (09 Jan 2022 09:00) (67 - 93)  RR: 20 (09 Jan 2022 09:00) (18 - 22)  SpO2: 98% (09 Jan 2022 09:00) (93% - 100%)  CAPILLARY BLOOD GLUCOSE  162 (09 Jan 2022 08:00)      POCT Blood Glucose.: 121 mg/dL (09 Jan 2022 12:18)  POCT Blood Glucose.: 162 mg/dL (09 Jan 2022 06:54)  POCT Blood Glucose.: 123 mg/dL (09 Jan 2022 05:07)  POCT Blood Glucose.: 143 mg/dL (09 Jan 2022 02:43)  POCT Blood Glucose.: 127 mg/dL (09 Jan 2022 00:46)  POCT Blood Glucose.: 128 mg/dL (08 Jan 2022 22:33)  POCT Blood Glucose.: 89 mg/dL (08 Jan 2022 22:02)  POCT Blood Glucose.: 84 mg/dL (08 Jan 2022 21:47)  POCT Blood Glucose.: 96 mg/dL (08 Jan 2022 21:15)  POCT Blood Glucose.: 104 mg/dL (08 Jan 2022 20:18)  POCT Blood Glucose.: 180 mg/dL (08 Jan 2022 18:58)  POCT Blood Glucose.: 207 mg/dL (08 Jan 2022 18:15)  POCT Blood Glucose.: 186 mg/dL (08 Jan 2022 17:00)  POCT Blood Glucose.: 150 mg/dL (08 Jan 2022 16:02)  POCT Blood Glucose.: 189 mg/dL (08 Jan 2022 15:00)  POCT Blood Glucose.: 152 mg/dL (08 Jan 2022 14:23)  POCT Blood Glucose.: 132 mg/dL (08 Jan 2022 13:37)    I&O's Summary    08 Jan 2022 07:01  -  09 Jan 2022 07:00  --------------------------------------------------------  IN: 805.8 mL / OUT: 2450 mL / NET: -1644.2 mL    09 Jan 2022 07:01  -  09 Jan 2022 13:25  --------------------------------------------------------  IN: 150 mL / OUT: 885 mL / NET: -735 mL        PHYSICAL EXAM:  GENERAL: NAD, well-developed  HEAD:  Atraumatic, Normocephalic  EYES: EOMI, PERRLA, conjunctiva and sclera clear  NECK: Supple, No JVD  CHEST/LUNG: Clear to auscultation bilaterally; No wheeze  HEART: Regular rate and rhythm; No murmurs, rubs, or gallops  ABDOMEN: Soft, Nontender, Nondistended; Bowel sounds present  EXTREMITIES:  2+ Peripheral Pulses, No clubbing, cyanosis, or edema  PSYCH: AAOx3  NEUROLOGY: non-focal  SKIN: No rashes or lesions    LABS:                        9.3    9.85  )-----------( 192      ( 09 Jan 2022 01:17 )             29.1     01-09    137  |  104  |  34<H>  ----------------------------<  130<H>  4.4   |  23  |  1.38<H>    Ca    8.6      09 Jan 2022 01:17  Phos  3.0     01-09  Mg     2.4     01-09    TPro  6.2  /  Alb  3.8  /  TBili  0.7  /  DBili  x   /  AST  20  /  ALT  27  /  AlkPhos  42  01-09              RADIOLOGY & ADDITIONAL TESTS:    Imaging Personally Reviewed:    Consultant(s) Notes Reviewed:      Care Discussed with Consultants/Other Providers:

## 2022-01-09 NOTE — PROGRESS NOTE ADULT - SUBJECTIVE AND OBJECTIVE BOX
VERO MILLER  MRN-[id number]  Patient is a 76y old  Male who presents with a chief complaint of C3L (08 Jan 2022 08:51)    HPI:  76 yr old male with PMH of HTN, HLD, Type 2 DM, CAD, former smoker presented to Dr. Augusto Cordero for follow up and underwent stress test which showed inferior, inferolateral, and septal ischemia, EF 60%. Patient presents today for further ischemic workup. Denies any chest pain or shortness of breath today.  Of note, patient recently lost wife to pancreatic cancer and son to COVID.  (05 Jan 2022 09:28)      Surgery/Hospital course:  1/6 R Fem pre-op IABP, CABG     Today:    REVIEW OF SYSTEMS:  Gen: No fever  EYES/ENT: No visual changes;  No vertigo or throat pain   NECK: No pain   RES:  No shortness of breath or Cough  CARD: No chest pain   GI: No abdominal pain  : No dysuria  NEURO: No weakness  SKIN: No itching, rashes     Physical Exam:  Vital Signs Last 24 Hrs  T(C): 36.4 (09 Jan 2022 04:00), Max: 37.3 (09 Jan 2022 00:00)  T(F): 97.6 (09 Jan 2022 04:00), Max: 99.1 (09 Jan 2022 00:00)  HR: 80 (09 Jan 2022 07:00) (77 - 112)  BP: 122/74 (09 Jan 2022 07:00) (91/55 - 122/74)  BP(mean): 93 (09 Jan 2022 07:00) (67 - 93)  RR: 18 (09 Jan 2022 07:00) (18 - 26)  SpO2: 98% (09 Jan 2022 07:00) (93% - 100%)  Gen:  Awake, alert   CNS: non focal 	  Neck: no JVD  RES : clear , no wheezing                       CVS: Regular  rhythm. Normal S1/S2  Abd: Soft, non-distended. Bowel sounds present.  Skin: No rash.  Ext:  no edema, A Line  ============================I/O===========================   I&O's Detail    08 Jan 2022 07:01  -  09 Jan 2022 07:00  --------------------------------------------------------  IN:    Insulin: 66 mL    IV PiggyBack: 549.8 mL    sodium chloride 0.9%: 190 mL  Total IN: 805.8 mL    OUT:    Chest Tube (mL): 50 mL    Chest Tube (mL): 60 mL    Voided (mL): 2340 mL  Total OUT: 2450 mL    Total NET: -1644.2 mL        ============================ LABS =========================                        9.3    9.85  )-----------( 192      ( 09 Jan 2022 01:17 )             29.1     01-09    137  |  104  |  34<H>  ----------------------------<  130<H>  4.4   |  23  |  1.38<H>    Ca    8.6      09 Jan 2022 01:17  Phos  3.0     01-09  Mg     2.4     01-09    TPro  6.2  /  Alb  3.8  /  TBili  0.7  /  DBili  x   /  AST  20  /  ALT  27  /  AlkPhos  42  01-09    LIVER FUNCTIONS - ( 09 Jan 2022 01:17 )  Alb: 3.8 g/dL / Pro: 6.2 g/dL / ALK PHOS: 42 U/L / ALT: 27 U/L / AST: 20 U/L / GGT: x             ABG - ( 08 Jan 2022 03:40 )  pH, Arterial: 7.42  pH, Blood: x     /  pCO2: 36    /  pO2: 80    / HCO3: 23    / Base Excess: -0.9  /  SaO2: 94.3                ======================Micro/Rad/Cardio=================  CXR: Reviewed  Echo:Reviewed  ======================================================  PAST MEDICAL & SURGICAL HISTORY:  HTN (hypertension)    Diabetes    Hyperlipidemia    CAD (coronary artery disease)    History of tonsillectomy      ====================ASSESSMENT ==============  CAD/ASHD s/p CABG 1/6   Hemodynamic instability  Anemia blood loss  Atrial fibrillation   Hyperglycemia                                                                                       Former smoker  Hypertension   Diabetes mellitus type 2  Hyperlipidemia     Plan:  ====================== NEUROLOGY=====================  Continue close monitoring of neuro status   Tylenol, Gabapentin, Hydromorphone, and Oxycodone PRN for analgesia     acetaminophen     Tablet .. 650 milliGRAM(s) Oral every 6 hours  acetaminophen     Tablet .. 650 milliGRAM(s) Oral every 6 hours PRN Mild Pain (1 - 3)  gabapentin 100 milliGRAM(s) Oral every 8 hours  oxyCODONE    IR 5 milliGRAM(s) Oral every 4 hours PRN Moderate Pain (4 - 6)  oxyCODONE    IR 10 milliGRAM(s) Oral every 4 hours PRN Severe Pain (7 - 10)    ==================== RESPIRATORY======================  Stable on room air, SpO2 93% - 100%  Encourage incentive spirometry, continue pulse ox monitoring, follow ABGs      ====================CARDIOVASCULAR==================  CAD s/p CABG 1/6, IABP placement 1/6  with good augmentation 1:1/ Intra-aortic balloon pump was D/C'D 1/7  New onset atrial fibrillation c/w amio   Continue invasive hemodynamic monitoring   Lactate 0.8, continue trending   Lopressor for rate control  ASA/Lipitor for CAD     aMIOdarone    Tablet 400 milliGRAM(s) Oral every 8 hours  aspirin enteric coated 325 milliGRAM(s) Oral daily  metoprolol tartrate 25 milliGRAM(s) Oral every 8 hours  atorvastatin 80 milliGRAM(s) Oral at bedtime    ===================HEMATOLOGIC/ONC ===================  Continue to monitor H&H/Plts   Received 1 PRBC 1/8     VTE prophylaxis, enoxaparin Injectable 40 milliGRAM(s) SubCutaneous daily    ===================== RENAL =========================  Merrill for monitoring urine output  Continue monitoring I&Os and BUN/Cr  Replete lytes PRN. Keep K> 4 and Mg >2  continue diuresis with PO Lasix    furosemide    Tablet 40 milliGRAM(s) Oral daily    ==================== GASTROINTESTINAL===================  Tolerating regular PO diet   Bowel regimen with Miralax and Senna     ascorbic acid 500 milliGRAM(s) Oral two times a day  bisacodyl Suppository 10 milliGRAM(s) Rectal once  dextrose 5%. 1000 milliLiter(s) (50 mL/Hr) IV Continuous <Continuous>  multivitamin 1 Tablet(s) Oral daily  GI prophylaxis, pantoprazole    Tablet 40 milliGRAM(s) Oral before breakfast  polyethylene glycol 3350 17 Gram(s) Oral daily  potassium chloride    Tablet ER 40 milliEquivalent(s) Oral daily  senna 2 Tablet(s) Oral at bedtime    =======================    ENDOCRINE  =====================  DM2 continue glycemic control with admelog sliding scale     dextrose 40% Gel 15 Gram(s) Oral once  dextrose 50% Injectable 50 milliLiter(s) IV Push every 15 minutes  glucagon  Injectable 1 milliGRAM(s) IntraMuscular once  insulin glargine Injectable (LANTUS) 24 Unit(s) SubCutaneous at bedtime  insulin lispro (ADMELOG) corrective regimen sliding scale   SubCutaneous at bedtime  insulin lispro (ADMELOG) corrective regimen sliding scale   SubCutaneous three times a day before meals  insulin lispro Injectable (ADMELOG) 8 Unit(s) SubCutaneous three times a day before meals    ========================INFECTIOUS DISEASE================  Afebrile, WBC within normal limits  Continue trending WBC and monitoring fever curve     Patient requires continuous monitoring with:  bedside rhythm monitoring, arterial line, pulse oximetry, ventilator management and monitoring; intermittent blood gas analysis.  Care plan discussed with ICU care team.  patient remain critical; required more than usual post op care; I have spent 35 minutes providing non routine post op care, revaluated multiple times during the day .     By signing my name below, I, Maggie Fontana, attest that this documentation has been prepared under the direction and in the presence of Madie Arana MD.  Electronically signed: Quentin Valentine, 01-09-22 @ 07:41    I, Madie Arana, personally performed the services described in this documentation. all medical record entries made by the joselitoibraphael were at my direction and in my presence. I have reviewed the chart and agree that the record reflects my personal performance and is accurate and complete  Electronically signed: Madie Arana, 01-09-22 @ 07:41       VERO MILLER  MRN-[id number]  Patient is a 76y old  Male who presents with a chief complaint of C3L (08 Jan 2022 08:51)    HPI:  76 yr old male with PMH of HTN, HLD, Type 2 DM, CAD, former smoker presented to Dr. Augusto Cordero for follow up and underwent stress test which showed inferior, inferolateral, and septal ischemia, EF 60%. Patient presents today for further ischemic workup. Denies any chest pain or shortness of breath today.  Of note, patient recently lost wife to pancreatic cancer and son to COVID.  (05 Jan 2022 09:28)      Surgery/Hospital course:  1/6 R Fem pre-op IABP, CABG     Today:  - Increased lopressor this AM   - Patient is going to floor     REVIEW OF SYSTEMS:  Gen: No fever  EYES/ENT: No visual changes;  No vertigo or throat pain   NECK: No pain   RES:  No shortness of breath or Cough  CARD: No chest pain   GI: No abdominal pain  : No dysuria  NEURO: No weakness  SKIN: No itching, rashes     Physical Exam:  Vital Signs Last 24 Hrs  T(C): 36.4 (09 Jan 2022 04:00), Max: 37.3 (09 Jan 2022 00:00)  T(F): 97.6 (09 Jan 2022 04:00), Max: 99.1 (09 Jan 2022 00:00)  HR: 80 (09 Jan 2022 07:00) (77 - 112)  BP: 122/74 (09 Jan 2022 07:00) (91/55 - 122/74)  BP(mean): 93 (09 Jan 2022 07:00) (67 - 93)  RR: 18 (09 Jan 2022 07:00) (18 - 26)  SpO2: 98% (09 Jan 2022 07:00) (93% - 100%)  Gen:  Awake, alert   CNS: non focal 	  Neck: no JVD  RES : clear , no wheezing                       CVS: Regular  rhythm. Normal S1/S2  Abd: Soft, non-distended. Bowel sounds present.  Skin: No rash.  Ext:  no edema, A Line  ============================I/O===========================   I&O's Detail    08 Jan 2022 07:01  -  09 Jan 2022 07:00  --------------------------------------------------------  IN:    Insulin: 66 mL    IV PiggyBack: 549.8 mL    sodium chloride 0.9%: 190 mL  Total IN: 805.8 mL    OUT:    Chest Tube (mL): 50 mL    Chest Tube (mL): 60 mL    Voided (mL): 2340 mL  Total OUT: 2450 mL    Total NET: -1644.2 mL        ============================ LABS =========================                        9.3    9.85  )-----------( 192      ( 09 Jan 2022 01:17 )             29.1     01-09    137  |  104  |  34<H>  ----------------------------<  130<H>  4.4   |  23  |  1.38<H>    Ca    8.6      09 Jan 2022 01:17  Phos  3.0     01-09  Mg     2.4     01-09    TPro  6.2  /  Alb  3.8  /  TBili  0.7  /  DBili  x   /  AST  20  /  ALT  27  /  AlkPhos  42  01-09    LIVER FUNCTIONS - ( 09 Jan 2022 01:17 )  Alb: 3.8 g/dL / Pro: 6.2 g/dL / ALK PHOS: 42 U/L / ALT: 27 U/L / AST: 20 U/L / GGT: x             ABG - ( 08 Jan 2022 03:40 )  pH, Arterial: 7.42  pH, Blood: x     /  pCO2: 36    /  pO2: 80    / HCO3: 23    / Base Excess: -0.9  /  SaO2: 94.3                ======================Micro/Rad/Cardio=================  CXR: Reviewed  Echo:Reviewed  ======================================================  PAST MEDICAL & SURGICAL HISTORY:  HTN (hypertension)    Diabetes    Hyperlipidemia    CAD (coronary artery disease)    History of tonsillectomy      ====================ASSESSMENT ==============  CAD/ASHD s/p CABG 1/6   Hemodynamic instability  Anemia blood loss  Atrial fibrillation   Hyperglycemia                                                                                       Former smoker  Hypertension   Diabetes mellitus type 2  Hyperlipidemia     Plan:  ====================== NEUROLOGY=====================  Continue close monitoring of neuro status   Tylenol, Gabapentin, Hydromorphone, and Oxycodone PRN for analgesia     acetaminophen     Tablet .. 650 milliGRAM(s) Oral every 6 hours  acetaminophen     Tablet .. 650 milliGRAM(s) Oral every 6 hours PRN Mild Pain (1 - 3)  gabapentin 100 milliGRAM(s) Oral every 8 hours  oxyCODONE    IR 5 milliGRAM(s) Oral every 4 hours PRN Moderate Pain (4 - 6)  oxyCODONE    IR 10 milliGRAM(s) Oral every 4 hours PRN Severe Pain (7 - 10)    ==================== RESPIRATORY======================  Stable on room air, SpO2 93% - 100%  Encourage incentive spirometry, continue pulse ox monitoring, follow ABGs      ====================CARDIOVASCULAR==================  CAD s/p CABG 1/6, IABP placement 1/6  with good augmentation 1:1/ Intra-aortic balloon pump was D/C'D 1/7  New onset atrial fibrillation c/w amio   Continue invasive hemodynamic monitoring   Lactate 0.8, continue trending   Lopressor for rate control  ASA/Lipitor for CAD     aMIOdarone    Tablet 400 milliGRAM(s) Oral every 8 hours  aspirin enteric coated 325 milliGRAM(s) Oral daily  metoprolol tartrate 25 milliGRAM(s) Oral every 8 hours  atorvastatin 80 milliGRAM(s) Oral at bedtime    ===================HEMATOLOGIC/ONC ===================  Continue to monitor H&H/Plts   Received 1 PRBC 1/8     VTE prophylaxis, enoxaparin Injectable 40 milliGRAM(s) SubCutaneous daily    ===================== RENAL =========================  Merrill for monitoring urine output  Continue monitoring I&Os and BUN/Cr  Replete lytes PRN. Keep K> 4 and Mg >2  continue diuresis with PO Lasix    furosemide    Tablet 40 milliGRAM(s) Oral daily    ==================== GASTROINTESTINAL===================  Tolerating regular PO diet   Bowel regimen with Miralax and Senna     ascorbic acid 500 milliGRAM(s) Oral two times a day  bisacodyl Suppository 10 milliGRAM(s) Rectal once  dextrose 5%. 1000 milliLiter(s) (50 mL/Hr) IV Continuous <Continuous>  multivitamin 1 Tablet(s) Oral daily  GI prophylaxis, pantoprazole    Tablet 40 milliGRAM(s) Oral before breakfast  polyethylene glycol 3350 17 Gram(s) Oral daily  potassium chloride    Tablet ER 40 milliEquivalent(s) Oral daily  senna 2 Tablet(s) Oral at bedtime    =======================    ENDOCRINE  =====================  DM2 continue glycemic control with admelog sliding scale     dextrose 40% Gel 15 Gram(s) Oral once  dextrose 50% Injectable 50 milliLiter(s) IV Push every 15 minutes  glucagon  Injectable 1 milliGRAM(s) IntraMuscular once  insulin glargine Injectable (LANTUS) 24 Unit(s) SubCutaneous at bedtime  insulin lispro (ADMELOG) corrective regimen sliding scale   SubCutaneous at bedtime  insulin lispro (ADMELOG) corrective regimen sliding scale   SubCutaneous three times a day before meals  insulin lispro Injectable (ADMELOG) 8 Unit(s) SubCutaneous three times a day before meals    ========================INFECTIOUS DISEASE================  Afebrile, WBC within normal limits  Continue trending WBC and monitoring fever curve     Patient requires continuous monitoring with:  bedside rhythm monitoring, arterial line, pulse oximetry, ventilator management and monitoring; intermittent blood gas analysis.  Care plan discussed with ICU care team.  patient remain critical; required more than usual post op care; I have spent 35 minutes providing non routine post op care, revaluated multiple times during the day .     By signing my name below, I, Maggie Fontana, attest that this documentation has been prepared under the direction and in the presence of Madie Arana MD.  Electronically signed: Quentin Valentine, 01-09-22 @ 07:41    I, Madie Arana, personally performed the services described in this documentation. all medical record entries made by the scribe were at my direction and in my presence. I have reviewed the chart and agree that the record reflects my personal performance and is accurate and complete  Electronically signed: Madie Arana, 01-09-22 @ 07:41       VERO MILLER  MRN-[id number]  Patient is a 76y old  Male who presents with a chief complaint of C3L (08 Jan 2022 08:51)    HPI:  76 yr old male with PMH of HTN, HLD, Type 2 DM, CAD, former smoker presented to Dr. Augusto Cordero for follow up and underwent stress test which showed inferior, inferolateral, and septal ischemia, EF 60%. Patient presents today for further ischemic workup. Denies any chest pain or shortness of breath today.  Of note, patient recently lost wife to pancreatic cancer and son to COVID.  (05 Jan 2022 09:28)      Surgery/Hospital course:  1/6 CABG X 3, R Fem pre-op IABP, CABG     Today:  - Increased lopressor this AM   - Patient is going to floor     REVIEW OF SYSTEMS:  Gen: No fever  EYES/ENT: No visual changes;  No vertigo or throat pain   NECK: No pain   RES:  No shortness of breath or Cough  CARD: No chest pain   GI: No abdominal pain  : No dysuria  NEURO: No weakness  SKIN: No itching, rashes     Physical Exam:  Vital Signs Last 24 Hrs  T(C): 36.4 (09 Jan 2022 04:00), Max: 37.3 (09 Jan 2022 00:00)  T(F): 97.6 (09 Jan 2022 04:00), Max: 99.1 (09 Jan 2022 00:00)  HR: 80 (09 Jan 2022 07:00) (77 - 112)  BP: 122/74 (09 Jan 2022 07:00) (91/55 - 122/74)  BP(mean): 93 (09 Jan 2022 07:00) (67 - 93)  RR: 18 (09 Jan 2022 07:00) (18 - 26)  SpO2: 98% (09 Jan 2022 07:00) (93% - 100%)  Gen:  Awake, alert   CNS: non focal 	  Neck: no JVD  RES : clear , no wheezing                       CVS: Regular  rhythm. Normal S1/S2  Abd: Soft, non-distended. Bowel sounds present.  Skin: No rash.  Ext:  no edema, A Line  ============================I/O===========================   I&O's Detail    08 Jan 2022 07:01  -  09 Jan 2022 07:00  --------------------------------------------------------  IN:    Insulin: 66 mL    IV PiggyBack: 549.8 mL    sodium chloride 0.9%: 190 mL  Total IN: 805.8 mL    OUT:    Chest Tube (mL): 50 mL    Chest Tube (mL): 60 mL    Voided (mL): 2340 mL  Total OUT: 2450 mL    Total NET: -1644.2 mL        ============================ LABS =========================                        9.3    9.85  )-----------( 192      ( 09 Jan 2022 01:17 )             29.1     01-09    137  |  104  |  34<H>  ----------------------------<  130<H>  4.4   |  23  |  1.38<H>    Ca    8.6      09 Jan 2022 01:17  Phos  3.0     01-09  Mg     2.4     01-09    TPro  6.2  /  Alb  3.8  /  TBili  0.7  /  DBili  x   /  AST  20  /  ALT  27  /  AlkPhos  42  01-09    LIVER FUNCTIONS - ( 09 Jan 2022 01:17 )  Alb: 3.8 g/dL / Pro: 6.2 g/dL / ALK PHOS: 42 U/L / ALT: 27 U/L / AST: 20 U/L / GGT: x             ABG - ( 08 Jan 2022 03:40 )  pH, Arterial: 7.42  pH, Blood: x     /  pCO2: 36    /  pO2: 80    / HCO3: 23    / Base Excess: -0.9  /  SaO2: 94.3                ======================Micro/Rad/Cardio=================  CXR: Reviewed  Echo:Reviewed  ======================================================  PAST MEDICAL & SURGICAL HISTORY:  HTN (hypertension)    Diabetes    Hyperlipidemia    CAD (coronary artery disease)    History of tonsillectomy      ====================ASSESSMENT ==============  1/6 CAD/ASHD s/p CABG   Anemia blood loss  Paroxysmal Atrial fibrillation   Hyperglycemia                                                                                       Former smoker  Hypertension   Diabetes mellitus type 2  Hyperlipidemia     Plan:  ====================== NEUROLOGY=====================  Continue close monitoring of neuro status   Tylenol, Gabapentin, Hydromorphone, and Oxycodone PRN for analgesia     acetaminophen     Tablet .. 650 milliGRAM(s) Oral every 6 hours  acetaminophen     Tablet .. 650 milliGRAM(s) Oral every 6 hours PRN Mild Pain (1 - 3)  gabapentin 100 milliGRAM(s) Oral every 8 hours  oxyCODONE    IR 5 milliGRAM(s) Oral every 4 hours PRN Moderate Pain (4 - 6)  oxyCODONE    IR 10 milliGRAM(s) Oral every 4 hours PRN Severe Pain (7 - 10)    ==================== RESPIRATORY======================  Stable on room air, SpO2 93% - 100%  Encourage incentive spirometry, continue pulse ox monitoring, follow ABGs      ====================CARDIOVASCULAR==================  CAD s/p CABG 1/6, IABP placement 1/6  with good augmentation 1:1/ Intra-aortic balloon pump was D/C'D 1/7  New onset atrial fibrillation c/w amio  converted to sinus rhythm   increase Lopressor to 50 mg q.12 hours                                                                                                                                                                                                                                                       ASA/Lipitor for CAD     aMIOdarone    Tablet 400 milliGRAM(s) Oral every 8 hours  aspirin enteric coated 325 milliGRAM(s) Oral daily  metoprolol tartrate 25 milliGRAM(s) Oral every 8 hours  atorvastatin 80 milliGRAM(s) Oral at bedtime    ===================HEMATOLOGIC/ONC ===================  Continue to monitor H&H/Plts   Received 1 PRBC 1/8     VTE prophylaxis, enoxaparin Injectable 40 milliGRAM(s) SubCutaneous daily    ===================== RENAL =========================  Merrill for monitoring urine output  Continue monitoring I&Os and BUN/Cr  Replete lytes PRN. Keep K> 4 and Mg >2  continue diuresis with PO Lasix    furosemide    Tablet 40 milliGRAM(s) Oral daily    ==================== GASTROINTESTINAL===================  Tolerating regular PO diet   Bowel regimen with Miralax and Senna     ascorbic acid 500 milliGRAM(s) Oral two times a day  bisacodyl Suppository 10 milliGRAM(s) Rectal once  dextrose 5%. 1000 milliLiter(s) (50 mL/Hr) IV Continuous <Continuous>  multivitamin 1 Tablet(s) Oral daily  GI prophylaxis, pantoprazole    Tablet 40 milliGRAM(s) Oral before breakfast  polyethylene glycol 3350 17 Gram(s) Oral daily  potassium chloride    Tablet ER 40 milliEquivalent(s) Oral daily  senna 2 Tablet(s) Oral at bedtime    =======================    ENDOCRINE  =====================  DM2 continue glycemic control with admelog sliding scale     dextrose 40% Gel 15 Gram(s) Oral once  dextrose 50% Injectable 50 milliLiter(s) IV Push every 15 minutes  glucagon  Injectable 1 milliGRAM(s) IntraMuscular once  insulin glargine Injectable (LANTUS) 24 Unit(s) SubCutaneous at bedtime  insulin lispro (ADMELOG) corrective regimen sliding scale   SubCutaneous at bedtime  insulin lispro (ADMELOG) corrective regimen sliding scale   SubCutaneous three times a day before meals  insulin lispro Injectable (ADMELOG) 8 Unit(s) SubCutaneous three times a day before meals    ========================INFECTIOUS DISEASE================  Afebrile, WBC within normal limits  Continue trending WBC and monitoring fever curve       By signing my name below, I, Maggie Fontana, attest that this documentation has been prepared under the direction and in the presence of Madie Arana MD.  Electronically signed: Quentin Valentine, 01-09-22 @ 07:41    I, Madie Arana, personally performed the services described in this documentation. all medical record entries made by the scribe were at my direction and in my presence. I have reviewed the chart and agree that the record reflects my personal performance and is accurate and complete  Electronically signed: Madie Arana, 01-09-22 @ 07:41

## 2022-01-09 NOTE — PROGRESS NOTE ADULT - SUBJECTIVE AND OBJECTIVE BOX
CARDIOLOGY FOLLOW UP - Dr. Masters  Date of Service: 1/9/22  CC: oob to chair  feeling well  no cp/sob     Review of Systems:  Constitutional: No fever, weight loss, or fatigue  Respiratory: No cough, wheezing, or hemoptysis, no shortness of breath  Cardiovascular: No chest pain, palpitations, passing out, dizziness, or leg swelling  Gastrointestinal: No abd or epigastric pain.  No nausea, vomiting, or hematemesis; no diarrhea or constipation, no melena or hematochezia  Vascular: no edema       PHYSICAL EXAM:  T(C): 36.2 (01-09-22 @ 08:00), Max: 37.3 (01-09-22 @ 00:00)  HR: 87 (01-09-22 @ 09:00) (77 - 95)  BP: 115/56 (01-09-22 @ 09:00) (91/55 - 123/60)  RR: 20 (01-09-22 @ 09:00) (18 - 25)  SpO2: 98% (01-09-22 @ 09:00) (93% - 100%)  Wt(kg): --  I&O's Summary    08 Jan 2022 07:01  -  09 Jan 2022 07:00  --------------------------------------------------------  IN: 805.8 mL / OUT: 2450 mL / NET: -1644.2 mL    09 Jan 2022 07:01  -  09 Jan 2022 09:58  --------------------------------------------------------  IN: 150 mL / OUT: 885 mL / NET: -735 mL        Appearance: Normal	  Cardiovascular: Normal S1 S2,RRR, No JVD, No murmurs  Respiratory: Lungs clear to auscultation	  Gastrointestinal:  Soft, Non-tender, + BS	  Extremities: Normal range of motion, No clubbing, cyanosis or edema      Home Medications:  Aspirin Enteric Coated 81 mg oral delayed release tablet: 1 tab(s) orally once a day (05 Jan 2022 11:36)  atorvastatin 40 mg oral tablet: 1 tab(s) orally once a day (05 Jan 2022 11:36)  glimepiride: patient unsure of dose (05 Jan 2022 11:36)  metFORMIN 1000 mg oral tablet: 1 tab(s) orally 2 times a day (05 Jan 2022 11:36)  olmesartan-hydrochlorothiazide 40 mg-25 mg oral tablet: 1 tab(s) orally once a day (05 Jan 2022 11:36)  Trulicity Pen 0.75 mg/0.5 mL subcutaneous solution: once a week on Saturday (05 Jan 2022 11:36)  Vitamin B-12: 3000 milligram(s) orally once a day (05 Jan 2022 11:36)  Vitamin D3 50 mcg (2000 intl units) oral capsule: 1 cap(s) orally once a day (05 Jan 2022 11:36)      MEDICATIONS  (STANDING):  aMIOdarone    Tablet 400 milliGRAM(s) Oral every 8 hours  aMIOdarone    Tablet   Oral   ascorbic acid 500 milliGRAM(s) Oral two times a day  aspirin enteric coated 325 milliGRAM(s) Oral daily  atorvastatin 80 milliGRAM(s) Oral at bedtime  bisacodyl Suppository 10 milliGRAM(s) Rectal once  chlorhexidine 2% Cloths 1 Application(s) Topical daily  dextrose 40% Gel 15 Gram(s) Oral once  dextrose 5%. 1000 milliLiter(s) (50 mL/Hr) IV Continuous <Continuous>  dextrose 50% Injectable 50 milliLiter(s) IV Push every 15 minutes  enoxaparin Injectable 40 milliGRAM(s) SubCutaneous daily  furosemide    Tablet 40 milliGRAM(s) Oral daily  gabapentin 100 milliGRAM(s) Oral every 8 hours  glucagon  Injectable 1 milliGRAM(s) IntraMuscular once  insulin glargine Injectable (LANTUS) 24 Unit(s) SubCutaneous at bedtime  insulin lispro (ADMELOG) corrective regimen sliding scale   SubCutaneous three times a day before meals  insulin lispro (ADMELOG) corrective regimen sliding scale   SubCutaneous at bedtime  insulin lispro Injectable (ADMELOG) 8 Unit(s) SubCutaneous three times a day before meals  metFORMIN 1000 milliGRAM(s) Oral two times a day  metoprolol tartrate 50 milliGRAM(s) Oral every 12 hours  multivitamin 1 Tablet(s) Oral daily  pantoprazole    Tablet 40 milliGRAM(s) Oral before breakfast  polyethylene glycol 3350 17 Gram(s) Oral daily  potassium chloride    Tablet ER 40 milliEquivalent(s) Oral daily  senna 2 Tablet(s) Oral at bedtime      TELEMETRY: afib 70s 	    ECG:  	  RADIOLOGY:  < from: Xray Chest 1 View- PORTABLE-Urgent (Xray Chest 1 View- PORTABLE-Urgent .) (01.08.22 @ 15:12) >    FINDINGS:    Interval removal of left-sided chest tube and IJ central venous catheter.  The heart is normal in size.  The lungs are clear.  There is no pneumothorax. No sizeable pleural effusion.    IMPRESSION:  Interval removal of left-sided chest catheter. No visualized pneumothorax.    --- End of Report ---    < end of copied text >    DIAGNOSTIC TESTING:  [ ] Echocardiogram:  [ ]  Catheterization:  [ ] Stress Test:    OTHER: 	    LABS:	 	    Troponin T, High Sensitivity Result: 337 ng/L [0 - 51] (01-07 @ 02:03)  Creatine Kinase, Serum: 358 U/L [30 - 200] (01-07 @ 02:03)  CKMB Units: 11.1 ng/mL [0.0 - 6.7] (01-07 @ 02:03)  Creatine Kinase, Serum: 290 U/L [30 - 200] (01-06 @ 14:29)  CKMB Units: 16.8 ng/mL [0.0 - 6.7] (01-06 @ 14:29)  Troponin T, High Sensitivity Result: 223 ng/L [0 - 51] (01-06 @ 14:29)                          9.3    9.85  )-----------( 192      ( 09 Jan 2022 01:17 )             29.1     01-09    137  |  104  |  34<H>  ----------------------------<  130<H>  4.4   |  23  |  1.38<H>    Ca    8.6      09 Jan 2022 01:17  Phos  3.0     01-09  Mg     2.4     01-09    TPro  6.2  /  Alb  3.8  /  TBili  0.7  /  DBili  x   /  AST  20  /  ALT  27  /  AlkPhos  42  01-09

## 2022-01-09 NOTE — PROGRESS NOTE ADULT - SUBJECTIVE AND OBJECTIVE BOX
Subjective: "I'm feeling really good." Denies chest pain, SOB, palpitations, headache, dizziness, fever, chills, or n/v/d.     Telemetry: SR 80s     Vital Signs Last 24 Hrs  T(C): 36.4 (22 @ 13:41), Max: 37.3 (22 @ 00:00)  T(F): 97.6 (22:41), Max: 99.1 (22 @ 00:00)  HR: 85 (22:41) (77 - 88)  BP: 123/75 (22 13:41) (91/61 - 123/75)  RR: 18 (22:) (18 - 20)  SpO2: 98% (22:41) (93% - 100%)              07:01  -   @ 07:00  --------------------------------------------------------  IN: 805.8 mL / OUT: 2450 mL / NET: -1644.2 mL     07:01  -   @ 15:17  --------------------------------------------------------  IN: 510 mL / OUT: 1185 mL / NET: -675 mL     Daily Weight in k.2 (2022 00:00)    CAPILLARY BLOOD GLUCOSE  162 (2022 08:00)    POCT Blood Glucose.: 121 mg/dL (2022 12:18)  POCT Blood Glucose.: 162 mg/dL (2022 06:54)  POCT Blood Glucose.: 123 mg/dL (2022 05:07)  POCT Blood Glucose.: 143 mg/dL (2022 02:43)  POCT Blood Glucose.: 127 mg/dL (2022 00:46)  POCT Blood Glucose.: 128 mg/dL (2022 22:33)  POCT Blood Glucose.: 89 mg/dL (2022 22:02)  POCT Blood Glucose.: 84 mg/dL (2022 21:47)  POCT Blood Glucose.: 96 mg/dL (2022 21:15)  POCT Blood Glucose.: 104 mg/dL (2022 20:18)  POCT Blood Glucose.: 180 mg/dL (2022 18:58)  POCT Blood Glucose.: 207 mg/dL (2022 18:15)  POCT Blood Glucose.: 186 mg/dL (2022 17:00)  POCT Blood Glucose.: 150 mg/dL (2022 16:02)    Pacing Wires        [ x ]   Settings: VVI 40/10                                 Isolated  [  ]                          9.3    9.85  )-----------( 192      ( 2022 01:17 )             29.1         137  |  104  |  34<H>  ----------------------------<  130<H>  4.4   |  23  |  1.38<H>    Ca    8.6      2022 01:17  Phos  3.0       Mg     2.4         TPro  6.2  /  Alb  3.8  /  TBili  0.7  /  DBili  x   /  AST  20  /  ALT  27  /  AlkPhos  42        PHYSICAL EXAM:    Neurology: alert and oriented x 3, nonfocal, no gross deficits    CV:    Sternal Wound: CDI, Stable    Lungs:    Abdomen: soft, nontender, nondistended, (+) bowel sounds, (+) BM    :               Extremities:           acetaminophen Tablet 650 milliGRAM(s) Oral every 6 hours PRN  aMIOdarone Tablet 400 milliGRAM(s) Oral every 8 hours  aMIOdarone Tablet Oral   ascorbic acid 500 milliGRAM(s) Oral two times a day  aspirin enteric coated 325 milliGRAM(s) Oral daily  atorvastatin 80 milliGRAM(s) Oral at bedtime  bisacodyl Suppository 10 milliGRAM(s) Rectal once  enoxaparin Injectable 40 milliGRAM(s) SubCutaneous daily  furosemide Tablet 40 milliGRAM(s) Oral daily  gabapentin 100 milliGRAM(s) Oral every 8 hours  glucagon  Injectable 1 milliGRAM(s) IntraMuscular once  insulin glargine Injectable (LANTUS) 24 Unit(s) SubCutaneous at bedtime  insulin lispro (ADMELOG) corrective regimen sliding scale SubCutaneous three times a day before meals  insulin lispro (ADMELOG) corrective regimen sliding scale SubCutaneous at bedtime  insulin lispro Injectable (ADMELOG) 8 Unit(s) SubCutaneous three times a day before meals  metFORMIN 1000 milliGRAM(s) Oral two times a day  metoprolol tartrate 50 milliGRAM(s) Oral every 12 hours  multivitamin 1 Tablet(s) Oral daily  oxyCODONE IR 5 milliGRAM(s) Oral every 4 hours PRN  oxyCODONE IR 10 milliGRAM(s) Oral every 4 hours PRN  pantoprazole Tablet 40 milliGRAM(s) Oral before breakfast  polyethylene glycol 3350 17 Gram(s) Oral daily  potassium chloride    Tablet ER 40 milliEquivalent(s) Oral daily  senna 2 Tablet(s) Oral at bedtime  sodium chloride 0.9% lock flush 3 milliLiter(s) IV Push every 8 hours                    Physical Therapy Rec:   Home  [  ]   Home w/ PT  [  ]  Rehab  [  ]    Discussed with Cardiothoracic Team at AM rounds. Subjective: "I'm feeling really good." Denies chest pain, SOB, palpitations, headache, dizziness, fever, chills, or n/v/d.     Telemetry: SR 80s     Vital Signs Last 24 Hrs  T(C): 36.4 (22 @ 13:41), Max: 37.3 (22 @ 00:00)  T(F): 97.6 (22:41), Max: 99.1 (22 @ 00:00)  HR: 85 (22:41) (77 - 88)  BP: 123/75 (22 13:41) (91/61 - 123/75)  RR: 18 (22:) (18 - 20)  SpO2: 98% (22:41) (93% - 100%)              07:01  -   @ 07:00  --------------------------------------------------------  IN: 805.8 mL / OUT: 2450 mL / NET: -1644.2 mL     07:01  -   @ 15:17  --------------------------------------------------------  IN: 510 mL / OUT: 1185 mL / NET: -675 mL     Daily Weight in k.2 (2022 00:00)    CAPILLARY BLOOD GLUCOSE  162 (2022 08:00)    POCT Blood Glucose.: 121 mg/dL (2022 12:18)  POCT Blood Glucose.: 162 mg/dL (2022 06:54)  POCT Blood Glucose.: 123 mg/dL (2022 05:07)  POCT Blood Glucose.: 143 mg/dL (2022 02:43)  POCT Blood Glucose.: 127 mg/dL (2022 00:46)  POCT Blood Glucose.: 128 mg/dL (2022 22:33)  POCT Blood Glucose.: 89 mg/dL (2022 22:02)  POCT Blood Glucose.: 84 mg/dL (2022 21:47)  POCT Blood Glucose.: 96 mg/dL (2022 21:15)  POCT Blood Glucose.: 104 mg/dL (2022 20:18)  POCT Blood Glucose.: 180 mg/dL (2022 18:58)  POCT Blood Glucose.: 207 mg/dL (2022 18:15)  POCT Blood Glucose.: 186 mg/dL (2022 17:00)  POCT Blood Glucose.: 150 mg/dL (2022 16:02)    Pacing Wires        [ x ]   Settings: VVI 40/10                                 Isolated  [  ]                          9.3    9.85  )-----------( 192      ( 2022 01:17 )             29.1         137  |  104  |  34<H>  ----------------------------<  130<H>  4.4   |  23  |  1.38<H>    Ca    8.6      2022 01:17  Phos  3.0       Mg     2.4         TPro  6.2  /  Alb  3.8  /  TBili  0.7  /  DBili  x   /  AST  20  /  ALT  27  /  AlkPhos  42        PHYSICAL EXAM:    Neurology: alert and oriented x 3, nonfocal, no gross deficits    CV: RRR, (+)S1/S2, no murmur appreciated    Sternal Wound: MSI CDI, Stable    Lungs: CTA b/l, no wheezes, rales, or rhonchi     Abdomen: soft, nontender, nondistended, (+) bowel sounds, (+) BM - last     : voiding freely               Extremities: no edema b/l, R LE ACE wrapped, (+) R SVG site w/ dressing in place - CDI, PPP b/l, no calf tenderness, ZAMORANO           acetaminophen Tablet 650 milliGRAM(s) Oral every 6 hours PRN  aMIOdarone Tablet 400 milliGRAM(s) Oral every 8 hours  aMIOdarone Tablet Oral   ascorbic acid 500 milliGRAM(s) Oral two times a day  aspirin enteric coated 325 milliGRAM(s) Oral daily  atorvastatin 80 milliGRAM(s) Oral at bedtime  bisacodyl Suppository 10 milliGRAM(s) Rectal once  enoxaparin Injectable 40 milliGRAM(s) SubCutaneous daily  furosemide Tablet 40 milliGRAM(s) Oral daily  gabapentin 100 milliGRAM(s) Oral every 8 hours  glucagon  Injectable 1 milliGRAM(s) IntraMuscular once  insulin glargine Injectable (LANTUS) 24 Unit(s) SubCutaneous at bedtime  insulin lispro (ADMELOG) corrective regimen sliding scale SubCutaneous three times a day before meals  insulin lispro (ADMELOG) corrective regimen sliding scale SubCutaneous at bedtime  insulin lispro Injectable (ADMELOG) 8 Unit(s) SubCutaneous three times a day before meals  metFORMIN 1000 milliGRAM(s) Oral two times a day  metoprolol tartrate 50 milliGRAM(s) Oral every 12 hours  multivitamin 1 Tablet(s) Oral daily  oxyCODONE IR 5 milliGRAM(s) Oral every 4 hours PRN  oxyCODONE IR 10 milliGRAM(s) Oral every 4 hours PRN  pantoprazole Tablet 40 milliGRAM(s) Oral before breakfast  polyethylene glycol 3350 17 Gram(s) Oral daily  potassium chloride    Tablet ER 40 milliEquivalent(s) Oral daily  senna 2 Tablet(s) Oral at bedtime  sodium chloride 0.9% lock flush 3 milliLiter(s) IV Push every 8 hours      Physical Therapy Rec:   Home  [ x ]   Home w/ PT  [  ]  Rehab  [  ]    Discussed with Cardiothoracic Team at AM rounds.

## 2022-01-10 ENCOUNTER — TRANSCRIPTION ENCOUNTER (OUTPATIENT)
Age: 77
End: 2022-01-10

## 2022-01-10 LAB
ANION GAP SERPL CALC-SCNC: 11 MMOL/L — SIGNIFICANT CHANGE UP (ref 5–17)
BASOPHILS # BLD AUTO: 0.03 K/UL — SIGNIFICANT CHANGE UP (ref 0–0.2)
BASOPHILS NFR BLD AUTO: 0.3 % — SIGNIFICANT CHANGE UP (ref 0–2)
BUN SERPL-MCNC: 42 MG/DL — HIGH (ref 7–23)
CALCIUM SERPL-MCNC: 8.9 MG/DL — SIGNIFICANT CHANGE UP (ref 8.4–10.5)
CHLORIDE SERPL-SCNC: 104 MMOL/L — SIGNIFICANT CHANGE UP (ref 96–108)
CO2 SERPL-SCNC: 23 MMOL/L — SIGNIFICANT CHANGE UP (ref 22–31)
CREAT SERPL-MCNC: 1.34 MG/DL — HIGH (ref 0.5–1.3)
EOSINOPHIL # BLD AUTO: 0.09 K/UL — SIGNIFICANT CHANGE UP (ref 0–0.5)
EOSINOPHIL NFR BLD AUTO: 0.9 % — SIGNIFICANT CHANGE UP (ref 0–6)
GLUCOSE SERPL-MCNC: 120 MG/DL — HIGH (ref 70–99)
HCT VFR BLD CALC: 29.1 % — LOW (ref 39–50)
HGB BLD-MCNC: 9.6 G/DL — LOW (ref 13–17)
IMM GRANULOCYTES NFR BLD AUTO: 0.6 % — SIGNIFICANT CHANGE UP (ref 0–1.5)
LYMPHOCYTES # BLD AUTO: 1.03 K/UL — SIGNIFICANT CHANGE UP (ref 1–3.3)
LYMPHOCYTES # BLD AUTO: 10.1 % — LOW (ref 13–44)
MCHC RBC-ENTMCNC: 28.6 PG — SIGNIFICANT CHANGE UP (ref 27–34)
MCHC RBC-ENTMCNC: 33 GM/DL — SIGNIFICANT CHANGE UP (ref 32–36)
MCV RBC AUTO: 86.6 FL — SIGNIFICANT CHANGE UP (ref 80–100)
MONOCYTES # BLD AUTO: 1.04 K/UL — HIGH (ref 0–0.9)
MONOCYTES NFR BLD AUTO: 10.2 % — SIGNIFICANT CHANGE UP (ref 2–14)
NEUTROPHILS # BLD AUTO: 7.97 K/UL — HIGH (ref 1.8–7.4)
NEUTROPHILS NFR BLD AUTO: 77.9 % — HIGH (ref 43–77)
NRBC # BLD: 0 /100 WBCS — SIGNIFICANT CHANGE UP (ref 0–0)
PLATELET # BLD AUTO: 265 K/UL — SIGNIFICANT CHANGE UP (ref 150–400)
POTASSIUM SERPL-MCNC: 4.2 MMOL/L — SIGNIFICANT CHANGE UP (ref 3.5–5.3)
POTASSIUM SERPL-SCNC: 4.2 MMOL/L — SIGNIFICANT CHANGE UP (ref 3.5–5.3)
RBC # BLD: 3.36 M/UL — LOW (ref 4.2–5.8)
RBC # FLD: 15.9 % — HIGH (ref 10.3–14.5)
SODIUM SERPL-SCNC: 138 MMOL/L — SIGNIFICANT CHANGE UP (ref 135–145)
WBC # BLD: 10.22 K/UL — SIGNIFICANT CHANGE UP (ref 3.8–10.5)
WBC # FLD AUTO: 10.22 K/UL — SIGNIFICANT CHANGE UP (ref 3.8–10.5)

## 2022-01-10 PROCEDURE — 71045 X-RAY EXAM CHEST 1 VIEW: CPT | Mod: 26

## 2022-01-10 RX ORDER — METOPROLOL TARTRATE 50 MG
50 TABLET ORAL EVERY 8 HOURS
Refills: 0 | Status: DISCONTINUED | OUTPATIENT
Start: 2022-01-10 | End: 2022-01-11

## 2022-01-10 RX ADMIN — Medication 8 UNIT(S): at 07:54

## 2022-01-10 RX ADMIN — SODIUM CHLORIDE 3 MILLILITER(S): 9 INJECTION INTRAMUSCULAR; INTRAVENOUS; SUBCUTANEOUS at 06:41

## 2022-01-10 RX ADMIN — SENNA PLUS 2 TABLET(S): 8.6 TABLET ORAL at 21:08

## 2022-01-10 RX ADMIN — METFORMIN HYDROCHLORIDE 1000 MILLIGRAM(S): 850 TABLET ORAL at 06:28

## 2022-01-10 RX ADMIN — Medication 40 MILLIGRAM(S): at 06:28

## 2022-01-10 RX ADMIN — Medication 8 UNIT(S): at 11:31

## 2022-01-10 RX ADMIN — GABAPENTIN 100 MILLIGRAM(S): 400 CAPSULE ORAL at 06:27

## 2022-01-10 RX ADMIN — AMIODARONE HYDROCHLORIDE 400 MILLIGRAM(S): 400 TABLET ORAL at 14:56

## 2022-01-10 RX ADMIN — GABAPENTIN 100 MILLIGRAM(S): 400 CAPSULE ORAL at 21:05

## 2022-01-10 RX ADMIN — GABAPENTIN 100 MILLIGRAM(S): 400 CAPSULE ORAL at 14:56

## 2022-01-10 RX ADMIN — POLYETHYLENE GLYCOL 3350 17 GRAM(S): 17 POWDER, FOR SOLUTION ORAL at 11:30

## 2022-01-10 RX ADMIN — Medication 500 MILLIGRAM(S): at 17:48

## 2022-01-10 RX ADMIN — Medication 50 MILLIGRAM(S): at 06:27

## 2022-01-10 RX ADMIN — Medication 40 MILLIEQUIVALENT(S): at 11:31

## 2022-01-10 RX ADMIN — ATORVASTATIN CALCIUM 80 MILLIGRAM(S): 80 TABLET, FILM COATED ORAL at 21:05

## 2022-01-10 RX ADMIN — Medication 50 MILLIGRAM(S): at 21:07

## 2022-01-10 RX ADMIN — PANTOPRAZOLE SODIUM 40 MILLIGRAM(S): 20 TABLET, DELAYED RELEASE ORAL at 06:28

## 2022-01-10 RX ADMIN — Medication 500 MILLIGRAM(S): at 06:27

## 2022-01-10 RX ADMIN — AMIODARONE HYDROCHLORIDE 400 MILLIGRAM(S): 400 TABLET ORAL at 21:05

## 2022-01-10 RX ADMIN — Medication 50 MILLIGRAM(S): at 14:56

## 2022-01-10 RX ADMIN — ENOXAPARIN SODIUM 40 MILLIGRAM(S): 100 INJECTION SUBCUTANEOUS at 11:30

## 2022-01-10 RX ADMIN — Medication 8 UNIT(S): at 17:48

## 2022-01-10 RX ADMIN — Medication 325 MILLIGRAM(S): at 11:30

## 2022-01-10 RX ADMIN — INSULIN GLARGINE 24 UNIT(S): 100 INJECTION, SOLUTION SUBCUTANEOUS at 22:14

## 2022-01-10 RX ADMIN — SODIUM CHLORIDE 3 MILLILITER(S): 9 INJECTION INTRAMUSCULAR; INTRAVENOUS; SUBCUTANEOUS at 14:16

## 2022-01-10 RX ADMIN — AMIODARONE HYDROCHLORIDE 400 MILLIGRAM(S): 400 TABLET ORAL at 06:27

## 2022-01-10 RX ADMIN — METFORMIN HYDROCHLORIDE 1000 MILLIGRAM(S): 850 TABLET ORAL at 17:48

## 2022-01-10 RX ADMIN — Medication 1 TABLET(S): at 11:30

## 2022-01-10 RX ADMIN — SODIUM CHLORIDE 3 MILLILITER(S): 9 INJECTION INTRAMUSCULAR; INTRAVENOUS; SUBCUTANEOUS at 21:08

## 2022-01-10 NOTE — PROGRESS NOTE ADULT - PROBLEM SELECTOR PLAN 4
SBP 110s-120s currently   Lopressor increased 50 mg po q8  DASH diet
SBP 110s-120s currently   Lopressor increased to 50 mg q12h today 1/9  DASH diet

## 2022-01-10 NOTE — PROGRESS NOTE ADULT - PROBLEM SELECTOR PLAN 1
Continue post-op care   Continue  mg QD & Atorvastatin 80 mg qhs  Lopressor increased to 50 mg q12h today 1/9  Continue gentle diuresis w/ Lasix 40 mg PO QD x 3 days   Maintain PW for now - VVI 40/10   Increase activity as tolerated, ambulate 4x daily and w/ PT   Chest PT, encourage incentive spirometry 10x/hr while awake   Pain control w/ Tylenol, Oxycodone, and Neurontin as per ERP protocol   Afib ppx w/ Vitamin C as per ERP protocol
Continue post-op care   Continue  mg QD & Atorvastatin 80 mg qhs  Lopressor increased to 50 mg po q8   Continue gentle diuresis w/ Lasix 40 mg PO QD x 3 days   isolate pw   Increase activity as tolerated, ambulate 4x daily and w/ PT   Chest PT, encourage incentive spirometry 10x/hr while awake   Pain control w/ Tylenol, Oxycodone, and Neurontin as per ERP protocol   Afib ppx w/ Vitamin C as per ERP protocol  pt---> stairs with PT   Discharge planning- home in am tue

## 2022-01-10 NOTE — DISCHARGE NOTE NURSING/CASE MANAGEMENT/SOCIAL WORK - PATIENT PORTAL LINK FT
You can access the FollowMyHealth Patient Portal offered by [facility] by registering at the following website: [email or web address]. By joining [facility]’s FollowMyHealth portal, you will also be able to view your health information using other applications (apps) compatible with our system.

## 2022-01-10 NOTE — PROGRESS NOTE ADULT - SUBJECTIVE AND OBJECTIVE BOX
DATE OF SERVICE: 01-10-22 @ 10:21    Patient is a 76y old  Male who presents with a chief complaint of s/p C3L (09 Jan 2022 15:16)      SUBJECTIVE / OVERNIGHT EVENTS:  No chest pain. No shortness of breath. No complaints. No events overnight.     MEDICATIONS  (STANDING):  aMIOdarone    Tablet 400 milliGRAM(s) Oral every 8 hours  aMIOdarone    Tablet   Oral   ascorbic acid 500 milliGRAM(s) Oral two times a day  aspirin enteric coated 325 milliGRAM(s) Oral daily  atorvastatin 80 milliGRAM(s) Oral at bedtime  bisacodyl Suppository 10 milliGRAM(s) Rectal once  dextrose 40% Gel 15 Gram(s) Oral once  dextrose 5%. 1000 milliLiter(s) (50 mL/Hr) IV Continuous <Continuous>  dextrose 50% Injectable 50 milliLiter(s) IV Push every 15 minutes  enoxaparin Injectable 40 milliGRAM(s) SubCutaneous daily  furosemide    Tablet 40 milliGRAM(s) Oral daily  gabapentin 100 milliGRAM(s) Oral every 8 hours  glucagon  Injectable 1 milliGRAM(s) IntraMuscular once  insulin glargine Injectable (LANTUS) 24 Unit(s) SubCutaneous at bedtime  insulin lispro (ADMELOG) corrective regimen sliding scale   SubCutaneous three times a day before meals  insulin lispro (ADMELOG) corrective regimen sliding scale   SubCutaneous at bedtime  insulin lispro Injectable (ADMELOG) 8 Unit(s) SubCutaneous three times a day before meals  metFORMIN 1000 milliGRAM(s) Oral two times a day  metoprolol tartrate 50 milliGRAM(s) Oral every 8 hours  multivitamin 1 Tablet(s) Oral daily  pantoprazole    Tablet 40 milliGRAM(s) Oral before breakfast  polyethylene glycol 3350 17 Gram(s) Oral daily  potassium chloride    Tablet ER 40 milliEquivalent(s) Oral daily  senna 2 Tablet(s) Oral at bedtime  sodium chloride 0.9% lock flush 3 milliLiter(s) IV Push every 8 hours    MEDICATIONS  (PRN):  acetaminophen     Tablet .. 650 milliGRAM(s) Oral every 6 hours PRN Mild Pain (1 - 3)  oxyCODONE    IR 5 milliGRAM(s) Oral every 4 hours PRN Moderate Pain (4 - 6)  oxyCODONE    IR 10 milliGRAM(s) Oral every 4 hours PRN Severe Pain (7 - 10)      Vital Signs Last 24 Hrs  T(C): 36.3 (10 Rob 2022 05:08), Max: 36.9 (09 Jan 2022 19:32)  T(F): 97.3 (10 Rob 2022 05:08), Max: 98.4 (09 Jan 2022 19:32)  HR: 94 (10 Rob 2022 05:08) (80 - 94)  BP: 150/72 (10 Rob 2022 05:08) (114/63 - 150/72)  BP(mean): 98 (10 Rob 2022 05:08) (98 - 98)  RR: 18 (10 Rob 2022 05:08) (18 - 18)  SpO2: 95% (10 Rob 2022 05:08) (95% - 98%)  CAPILLARY BLOOD GLUCOSE      POCT Blood Glucose.: 144 mg/dL (10 Rob 2022 07:31)  POCT Blood Glucose.: 145 mg/dL (09 Jan 2022 21:19)  POCT Blood Glucose.: 117 mg/dL (09 Jan 2022 16:40)  POCT Blood Glucose.: 121 mg/dL (09 Jan 2022 12:18)    I&O's Summary    09 Jan 2022 07:01  -  10 Rob 2022 07:00  --------------------------------------------------------  IN: 1050 mL / OUT: 1435 mL / NET: -385 mL    10 Rob 2022 07:01  -  10 Rob 2022 10:21  --------------------------------------------------------  IN: 240 mL / OUT: 300 mL / NET: -60 mL        PHYSICAL EXAM:  GENERAL: NAD, well-developed  HEAD:  Atraumatic, Normocephalic  EYES: EOMI, PERRLA, conjunctiva and sclera clear  NECK: Supple, No JVD  CHEST/LUNG: Clear to auscultation bilaterally; No wheeze  HEART: Regular rate and rhythm; No murmurs, rubs, or gallops  ABDOMEN: Soft, Nontender, Nondistended; Bowel sounds present  EXTREMITIES:  2+ Peripheral Pulses, No clubbing, cyanosis, or edema  PSYCH: AAOx3  NEUROLOGY: non-focal  SKIN: No rashes or lesions    LABS:                        9.6    10.22 )-----------( 265      ( 10 Rob 2022 06:58 )             29.1     01-10    138  |  104  |  42<H>  ----------------------------<  120<H>  4.2   |  23  |  1.34<H>    Ca    8.9      10 Rob 2022 06:58  Phos  3.0     01-09  Mg     2.4     01-09    TPro  6.2  /  Alb  3.8  /  TBili  0.7  /  DBili  x   /  AST  20  /  ALT  27  /  AlkPhos  42  01-09              RADIOLOGY & ADDITIONAL TESTS:    Imaging Personally Reviewed:    Consultant(s) Notes Reviewed:      Care Discussed with Consultants/Other Providers:

## 2022-01-10 NOTE — PROGRESS NOTE ADULT - SUBJECTIVE AND OBJECTIVE BOX
CARDIOLOGY FOLLOW UP - Dr. Masters  Date of Service: 1/10/22  CC: oob to chair  no cp/sob  feeling better     Review of Systems:  Constitutional: No fever, weight loss, or fatigue  Respiratory: No cough, wheezing, or hemoptysis, no shortness of breath  Cardiovascular: No chest pain, palpitations, passing out, dizziness, or leg swelling  Gastrointestinal: No abd or epigastric pain.  No nausea, vomiting, or hematemesis; no diarrhea or constipation, no melena or hematochezia  Vascular: no edema       PHYSICAL EXAM:  T(C): 36.3 (01-10-22 @ 05:08), Max: 36.9 (01-09-22 @ 19:32)  HR: 94 (01-10-22 @ 05:08) (80 - 94)  BP: 150/72 (01-10-22 @ 05:08) (114/63 - 150/72)  RR: 18 (01-10-22 @ 05:08) (18 - 18)  SpO2: 95% (01-10-22 @ 05:08) (95% - 98%)  Wt(kg): --  I&O's Summary    09 Jan 2022 07:01  -  10 Rob 2022 07:00  --------------------------------------------------------  IN: 1050 mL / OUT: 1435 mL / NET: -385 mL    10 Rob 2022 07:01  -  10 Rob 2022 10:55  --------------------------------------------------------  IN: 240 mL / OUT: 300 mL / NET: -60 mL        Appearance: Normal	  Cardiovascular: Normal S1 S2,RRR, No JVD, No murmurs  Respiratory: Lungs clear to auscultation	  Gastrointestinal:  Soft, Non-tender, + BS	  Extremities: Normal range of motion, No clubbing, cyanosis or edema      Home Medications:  Aspirin Enteric Coated 81 mg oral delayed release tablet: 1 tab(s) orally once a day (05 Jan 2022 11:36)  atorvastatin 40 mg oral tablet: 1 tab(s) orally once a day (05 Jan 2022 11:36)  glimepiride: patient unsure of dose (05 Jan 2022 11:36)  metFORMIN 1000 mg oral tablet: 1 tab(s) orally 2 times a day (05 Jan 2022 11:36)  olmesartan-hydrochlorothiazide 40 mg-25 mg oral tablet: 1 tab(s) orally once a day (05 Jan 2022 11:36)  Trulicity Pen 0.75 mg/0.5 mL subcutaneous solution: once a week on Saturday (05 Jan 2022 11:36)  Vitamin B-12: 3000 milligram(s) orally once a day (05 Jan 2022 11:36)  Vitamin D3 50 mcg (2000 intl units) oral capsule: 1 cap(s) orally once a day (05 Jan 2022 11:36)      MEDICATIONS  (STANDING):  aMIOdarone    Tablet 400 milliGRAM(s) Oral every 8 hours  aMIOdarone    Tablet   Oral   ascorbic acid 500 milliGRAM(s) Oral two times a day  aspirin enteric coated 325 milliGRAM(s) Oral daily  atorvastatin 80 milliGRAM(s) Oral at bedtime  bisacodyl Suppository 10 milliGRAM(s) Rectal once  dextrose 40% Gel 15 Gram(s) Oral once  dextrose 5%. 1000 milliLiter(s) (50 mL/Hr) IV Continuous <Continuous>  dextrose 50% Injectable 50 milliLiter(s) IV Push every 15 minutes  enoxaparin Injectable 40 milliGRAM(s) SubCutaneous daily  furosemide    Tablet 40 milliGRAM(s) Oral daily  gabapentin 100 milliGRAM(s) Oral every 8 hours  glucagon  Injectable 1 milliGRAM(s) IntraMuscular once  insulin glargine Injectable (LANTUS) 24 Unit(s) SubCutaneous at bedtime  insulin lispro (ADMELOG) corrective regimen sliding scale   SubCutaneous three times a day before meals  insulin lispro (ADMELOG) corrective regimen sliding scale   SubCutaneous at bedtime  insulin lispro Injectable (ADMELOG) 8 Unit(s) SubCutaneous three times a day before meals  metFORMIN 1000 milliGRAM(s) Oral two times a day  metoprolol tartrate 50 milliGRAM(s) Oral every 8 hours  multivitamin 1 Tablet(s) Oral daily  pantoprazole    Tablet 40 milliGRAM(s) Oral before breakfast  polyethylene glycol 3350 17 Gram(s) Oral daily  potassium chloride    Tablet ER 40 milliEquivalent(s) Oral daily  senna 2 Tablet(s) Oral at bedtime  sodium chloride 0.9% lock flush 3 milliLiter(s) IV Push every 8 hours      TELEMETRY: 	NSR     ECG:  	  RADIOLOGY: < from: Xray Chest 1 View- PORTABLE-Routine (01.09.22 @ 02:40) >    COMPARISON: Chest x-ray 1/8/2022.    FINDINGS:  Status post sternotomy and CABG.    The heart size is normal.  The lungs are clear.  There is no pneumothorax or pleural effusion.    IMPRESSION:  Clear lungs.    < end of copied text >    DIAGNOSTIC TESTING:  [ ] Echocardiogram:  [ ]  Catheterization:  [ ] Stress Test:    OTHER: 	    LABS:	 	    Troponin T, High Sensitivity Result: 337 ng/L [0 - 51] (01-07 @ 02:03)  Creatine Kinase, Serum: 358 U/L [30 - 200] (01-07 @ 02:03)  CKMB Units: 11.1 ng/mL [0.0 - 6.7] (01-07 @ 02:03)  Creatine Kinase, Serum: 290 U/L [30 - 200] (01-06 @ 14:29)  CKMB Units: 16.8 ng/mL [0.0 - 6.7] (01-06 @ 14:29)  Troponin T, High Sensitivity Result: 223 ng/L [0 - 51] (01-06 @ 14:29)                          9.6    10.22 )-----------( 265      ( 10 Rob 2022 06:58 )             29.1     01-10    138  |  104  |  42<H>  ----------------------------<  120<H>  4.2   |  23  |  1.34<H>    Ca    8.9      10 Rob 2022 06:58  Phos  3.0     01-09  Mg     2.4     01-09    TPro  6.2  /  Alb  3.8  /  TBili  0.7  /  DBili  x   /  AST  20  /  ALT  27  /  AlkPhos  42  01-09

## 2022-01-10 NOTE — PROGRESS NOTE ADULT - PROBLEM SELECTOR PLAN 3
Continue Lantus 24u qhs & Admelog 8u TID premeal   Metformin 1000 mg BID initiated today 1/9  Check FS TID premeal/qhs and cover w/ ISS prn   Consistent carbohydrate diet
Continue Lantus 24u qhs & Admelog 8u TID premeal   Metformin 1000 mg BID initiated today 1/9  Check FS TID premeal/qhs and cover w/ ISS prn   Consistent carbohydrate diet

## 2022-01-10 NOTE — PROGRESS NOTE ADULT - PROBLEM SELECTOR PLAN 2
Pt w/ new afib POD #2  Pt currently SR 80s  Amio load initiated 1/8  Lopressor increased to 50 mg po q8   Check lytes daily and replete prn, K > 4, Mg > 2
Pt w/ new afib POD #2  Pt currently SR 80s  Amio load initiated 1/8  Lopressor increased to 50 mg q12h today 1/9  Check lytes daily and replete prn, K > 4, Mg > 2

## 2022-01-10 NOTE — DISCHARGE NOTE NURSING/CASE MANAGEMENT/SOCIAL WORK - NSDCPEFALRISK_GEN_ALL_CORE
For information on Fall & Injury Prevention, visit: [email or web address] OR  [email or web address] OR  https://www.cdc.gov/steadi/patient.html

## 2022-01-10 NOTE — PROGRESS NOTE ADULT - SUBJECTIVE AND OBJECTIVE BOX
VITAL SIGNS    Telemetry:  rsr    Vital Signs Last 24 Hrs  T(C): 36.7 (01-10-22 @ 11:08), Max: 36.9 (22 @ 19:32)  T(F): 98.1 (01-10-22 @ 11:08), Max: 98.4 (22 @ 19:32)  HR: 77 (01-10-22 @ 11:08) (77 - 94)  BP: 118/66 (01-10-22 @ 11:08) (114/63 - 150/72)  RR: 18 (01-10-22 @ 11:08) (18 - 18)  SpO2: 95% (01-10-22 @ 11:08) (95% - 98%)             07:01  -  01-10 @ 07:00  --------------------------------------------------------  IN: 1050 mL / OUT: 1435 mL / NET: -385 mL    01-10 @ 07:01  -  01-10 @ 13:34  --------------------------------------------------------  IN: 240 mL / OUT: 300 mL / NET: -60 mL       Daily     Daily Weight in k.6 (10 Rob 2022 08:01)  Admit Wt: Drug Dosing Weight  Height (cm): 182.9 (2022 06:52)  Weight (kg): 90.7 (2022 06:52)  BMI (kg/m2): 27.1 (2022 06:52)  BSA (m2): 2.13 (2022 06:52)      CAPILLARY BLOOD GLUCOSE      POCT Blood Glucose.: 120 mg/dL (10 Rob 2022 11:24)  POCT Blood Glucose.: 144 mg/dL (10 Rob 2022 07:31)  POCT Blood Glucose.: 145 mg/dL (2022 21:19)  POCT Blood Glucose.: 117 mg/dL (2022 16:40)          acetaminophen     Tablet .. 650 milliGRAM(s) Oral every 6 hours PRN  aMIOdarone    Tablet 400 milliGRAM(s) Oral every 8 hours  aMIOdarone    Tablet   Oral   ascorbic acid 500 milliGRAM(s) Oral two times a day  aspirin enteric coated 325 milliGRAM(s) Oral daily  atorvastatin 80 milliGRAM(s) Oral at bedtime  bisacodyl Suppository 10 milliGRAM(s) Rectal once  dextrose 40% Gel 15 Gram(s) Oral once  dextrose 5%. 1000 milliLiter(s) IV Continuous <Continuous>  dextrose 50% Injectable 50 milliLiter(s) IV Push every 15 minutes  enoxaparin Injectable 40 milliGRAM(s) SubCutaneous daily  furosemide    Tablet 40 milliGRAM(s) Oral daily  gabapentin 100 milliGRAM(s) Oral every 8 hours  glucagon  Injectable 1 milliGRAM(s) IntraMuscular once  insulin glargine Injectable (LANTUS) 24 Unit(s) SubCutaneous at bedtime  insulin lispro (ADMELOG) corrective regimen sliding scale   SubCutaneous three times a day before meals  insulin lispro (ADMELOG) corrective regimen sliding scale   SubCutaneous at bedtime  insulin lispro Injectable (ADMELOG) 8 Unit(s) SubCutaneous three times a day before meals  metFORMIN 1000 milliGRAM(s) Oral two times a day  metoprolol tartrate 50 milliGRAM(s) Oral every 8 hours  multivitamin 1 Tablet(s) Oral daily  oxyCODONE    IR 5 milliGRAM(s) Oral every 4 hours PRN  oxyCODONE    IR 10 milliGRAM(s) Oral every 4 hours PRN  pantoprazole    Tablet 40 milliGRAM(s) Oral before breakfast  polyethylene glycol 3350 17 Gram(s) Oral daily  potassium chloride    Tablet ER 40 milliEquivalent(s) Oral daily  senna 2 Tablet(s) Oral at bedtime  sodium chloride 0.9% lock flush 3 milliLiter(s) IV Push every 8 hours      PHYSICAL EXAM    Subjective: "I want to go home tomorrow."   Neurology: alert and oriented x 3, nonfocal, no gross deficits  CV : tele:  RSR   Sternal Wound :  CDI with dressing , Stable; +pw- VVI 40   Lungs: clear. RR easy, unlabored   Abdomen: soft, nontender, nondistended, positive bowel sounds, + bowel movement;  Neg N/V/D   :  pt voiding without difficulty   Extremities:   ZAMORANO; neg LE edema, neg calf tenderness.   PPP bilaterally ; SVG site cdi w/ dressing/AB      PW: + VVI 40 MA 10   Chest tubes: none

## 2022-01-11 ENCOUNTER — TRANSCRIPTION ENCOUNTER (OUTPATIENT)
Age: 77
End: 2022-01-11

## 2022-01-11 VITALS
SYSTOLIC BLOOD PRESSURE: 120 MMHG | OXYGEN SATURATION: 96 % | RESPIRATION RATE: 18 BRPM | DIASTOLIC BLOOD PRESSURE: 84 MMHG | HEART RATE: 70 BPM | TEMPERATURE: 98 F

## 2022-01-11 LAB
ANION GAP SERPL CALC-SCNC: 12 MMOL/L — SIGNIFICANT CHANGE UP (ref 5–17)
BUN SERPL-MCNC: 43 MG/DL — HIGH (ref 7–23)
CALCIUM SERPL-MCNC: 9 MG/DL — SIGNIFICANT CHANGE UP (ref 8.4–10.5)
CHLORIDE SERPL-SCNC: 105 MMOL/L — SIGNIFICANT CHANGE UP (ref 96–108)
CO2 SERPL-SCNC: 23 MMOL/L — SIGNIFICANT CHANGE UP (ref 22–31)
CREAT SERPL-MCNC: 1.38 MG/DL — HIGH (ref 0.5–1.3)
GLUCOSE SERPL-MCNC: 103 MG/DL — HIGH (ref 70–99)
HCT VFR BLD CALC: 29.9 % — LOW (ref 39–50)
HGB BLD-MCNC: 9.7 G/DL — LOW (ref 13–17)
MCHC RBC-ENTMCNC: 28.8 PG — SIGNIFICANT CHANGE UP (ref 27–34)
MCHC RBC-ENTMCNC: 32.4 GM/DL — SIGNIFICANT CHANGE UP (ref 32–36)
MCV RBC AUTO: 88.7 FL — SIGNIFICANT CHANGE UP (ref 80–100)
NRBC # BLD: 0 /100 WBCS — SIGNIFICANT CHANGE UP (ref 0–0)
PLATELET # BLD AUTO: 301 K/UL — SIGNIFICANT CHANGE UP (ref 150–400)
POTASSIUM SERPL-MCNC: 4.2 MMOL/L — SIGNIFICANT CHANGE UP (ref 3.5–5.3)
POTASSIUM SERPL-SCNC: 4.2 MMOL/L — SIGNIFICANT CHANGE UP (ref 3.5–5.3)
RBC # BLD: 3.37 M/UL — LOW (ref 4.2–5.8)
RBC # FLD: 15.9 % — HIGH (ref 10.3–14.5)
SODIUM SERPL-SCNC: 140 MMOL/L — SIGNIFICANT CHANGE UP (ref 135–145)
WBC # BLD: 9.59 K/UL — SIGNIFICANT CHANGE UP (ref 3.8–10.5)
WBC # FLD AUTO: 9.59 K/UL — SIGNIFICANT CHANGE UP (ref 3.8–10.5)

## 2022-01-11 PROCEDURE — C1894: CPT

## 2022-01-11 PROCEDURE — 82803 BLOOD GASES ANY COMBINATION: CPT

## 2022-01-11 PROCEDURE — 93005 ELECTROCARDIOGRAM TRACING: CPT

## 2022-01-11 PROCEDURE — 82435 ASSAY OF BLOOD CHLORIDE: CPT

## 2022-01-11 PROCEDURE — 84295 ASSAY OF SERUM SODIUM: CPT

## 2022-01-11 PROCEDURE — C8929: CPT

## 2022-01-11 PROCEDURE — 82962 GLUCOSE BLOOD TEST: CPT

## 2022-01-11 PROCEDURE — 83036 HEMOGLOBIN GLYCOSYLATED A1C: CPT

## 2022-01-11 PROCEDURE — 71045 X-RAY EXAM CHEST 1 VIEW: CPT

## 2022-01-11 PROCEDURE — 84443 ASSAY THYROID STIM HORMONE: CPT

## 2022-01-11 PROCEDURE — 97530 THERAPEUTIC ACTIVITIES: CPT

## 2022-01-11 PROCEDURE — U0005: CPT

## 2022-01-11 PROCEDURE — 85025 COMPLETE CBC W/AUTO DIFF WBC: CPT

## 2022-01-11 PROCEDURE — 94002 VENT MGMT INPAT INIT DAY: CPT

## 2022-01-11 PROCEDURE — C1769: CPT

## 2022-01-11 PROCEDURE — 36430 TRANSFUSION BLD/BLD COMPNT: CPT

## 2022-01-11 PROCEDURE — 84100 ASSAY OF PHOSPHORUS: CPT

## 2022-01-11 PROCEDURE — 81001 URINALYSIS AUTO W/SCOPE: CPT

## 2022-01-11 PROCEDURE — 84484 ASSAY OF TROPONIN QUANT: CPT

## 2022-01-11 PROCEDURE — 87641 MR-STAPH DNA AMP PROBE: CPT

## 2022-01-11 PROCEDURE — 85018 HEMOGLOBIN: CPT

## 2022-01-11 PROCEDURE — 85014 HEMATOCRIT: CPT

## 2022-01-11 PROCEDURE — P9012: CPT

## 2022-01-11 PROCEDURE — 85396 CLOTTING ASSAY WHOLE BLOOD: CPT

## 2022-01-11 PROCEDURE — 82330 ASSAY OF CALCIUM: CPT

## 2022-01-11 PROCEDURE — 87640 STAPH A DNA AMP PROBE: CPT

## 2022-01-11 PROCEDURE — 84134 ASSAY OF PREALBUMIN: CPT

## 2022-01-11 PROCEDURE — 83735 ASSAY OF MAGNESIUM: CPT

## 2022-01-11 PROCEDURE — C1889: CPT

## 2022-01-11 PROCEDURE — 80048 BASIC METABOLIC PNL TOTAL CA: CPT

## 2022-01-11 PROCEDURE — P9037: CPT

## 2022-01-11 PROCEDURE — 93454 CORONARY ARTERY ANGIO S&I: CPT

## 2022-01-11 PROCEDURE — 85730 THROMBOPLASTIN TIME PARTIAL: CPT

## 2022-01-11 PROCEDURE — P9045: CPT

## 2022-01-11 PROCEDURE — 97116 GAIT TRAINING THERAPY: CPT

## 2022-01-11 PROCEDURE — 83605 ASSAY OF LACTIC ACID: CPT

## 2022-01-11 PROCEDURE — 99152 MOD SED SAME PHYS/QHP 5/>YRS: CPT

## 2022-01-11 PROCEDURE — 86900 BLOOD TYPING SEROLOGIC ABO: CPT

## 2022-01-11 PROCEDURE — 80053 COMPREHEN METABOLIC PANEL: CPT

## 2022-01-11 PROCEDURE — C1751: CPT

## 2022-01-11 PROCEDURE — 82565 ASSAY OF CREATININE: CPT

## 2022-01-11 PROCEDURE — 85384 FIBRINOGEN ACTIVITY: CPT

## 2022-01-11 PROCEDURE — 86850 RBC ANTIBODY SCREEN: CPT

## 2022-01-11 PROCEDURE — 83880 ASSAY OF NATRIURETIC PEPTIDE: CPT

## 2022-01-11 PROCEDURE — 84132 ASSAY OF SERUM POTASSIUM: CPT

## 2022-01-11 PROCEDURE — P9047: CPT

## 2022-01-11 PROCEDURE — 97161 PT EVAL LOW COMPLEX 20 MIN: CPT

## 2022-01-11 PROCEDURE — 86965 POOLING BLOOD PLATELETS: CPT

## 2022-01-11 PROCEDURE — C1887: CPT

## 2022-01-11 PROCEDURE — 93010 ELECTROCARDIOGRAM REPORT: CPT

## 2022-01-11 PROCEDURE — U0003: CPT

## 2022-01-11 PROCEDURE — P9016: CPT

## 2022-01-11 PROCEDURE — 82553 CREATINE MB FRACTION: CPT

## 2022-01-11 PROCEDURE — C9803: CPT

## 2022-01-11 PROCEDURE — 82947 ASSAY GLUCOSE BLOOD QUANT: CPT

## 2022-01-11 PROCEDURE — 85610 PROTHROMBIN TIME: CPT

## 2022-01-11 PROCEDURE — 86901 BLOOD TYPING SEROLOGIC RH(D): CPT

## 2022-01-11 PROCEDURE — 82550 ASSAY OF CK (CPK): CPT

## 2022-01-11 PROCEDURE — 85576 BLOOD PLATELET AGGREGATION: CPT

## 2022-01-11 PROCEDURE — 86923 COMPATIBILITY TEST ELECTRIC: CPT

## 2022-01-11 PROCEDURE — 33967 INSERT I-AORT PERCUT DEVICE: CPT

## 2022-01-11 PROCEDURE — 85027 COMPLETE CBC AUTOMATED: CPT

## 2022-01-11 PROCEDURE — 36415 COLL VENOUS BLD VENIPUNCTURE: CPT

## 2022-01-11 PROCEDURE — 86891 AUTOLOGOUS BLOOD OP SALVAGE: CPT

## 2022-01-11 RX ORDER — DULAGLUTIDE 4.5 MG/.5ML
0 INJECTION, SOLUTION SUBCUTANEOUS
Qty: 0 | Refills: 0 | DISCHARGE

## 2022-01-11 RX ORDER — SENNA PLUS 8.6 MG/1
2 TABLET ORAL
Qty: 0 | Refills: 0 | DISCHARGE
Start: 2022-01-11

## 2022-01-11 RX ORDER — OLMESARTAN MEDOXOMIL-HYDROCHLOROTHIAZIDE 25; 40 MG/1; MG/1
1 TABLET, FILM COATED ORAL
Qty: 0 | Refills: 0 | DISCHARGE

## 2022-01-11 RX ORDER — POLYETHYLENE GLYCOL 3350 17 G/17G
17 POWDER, FOR SOLUTION ORAL
Qty: 0 | Refills: 0 | DISCHARGE
Start: 2022-01-11

## 2022-01-11 RX ORDER — ATORVASTATIN CALCIUM 80 MG/1
1 TABLET, FILM COATED ORAL
Qty: 0 | Refills: 0 | DISCHARGE

## 2022-01-11 RX ORDER — ASPIRIN/CALCIUM CARB/MAGNESIUM 324 MG
1 TABLET ORAL
Qty: 0 | Refills: 0 | DISCHARGE

## 2022-01-11 RX ORDER — METFORMIN HYDROCHLORIDE 850 MG/1
1 TABLET ORAL
Qty: 0 | Refills: 0 | DISCHARGE

## 2022-01-11 RX ORDER — PREGABALIN 225 MG/1
3000 CAPSULE ORAL
Qty: 0 | Refills: 0 | DISCHARGE

## 2022-01-11 RX ORDER — METOPROLOL TARTRATE 50 MG
1 TABLET ORAL
Qty: 60 | Refills: 0
Start: 2022-01-11 | End: 2022-02-09

## 2022-01-11 RX ORDER — OXYCODONE HYDROCHLORIDE 5 MG/1
1 TABLET ORAL
Qty: 30 | Refills: 0
Start: 2022-01-11 | End: 2022-01-15

## 2022-01-11 RX ORDER — ACETAMINOPHEN 500 MG
2 TABLET ORAL
Qty: 0 | Refills: 0 | DISCHARGE
Start: 2022-01-11

## 2022-01-11 RX ORDER — CHOLECALCIFEROL (VITAMIN D3) 125 MCG
1 CAPSULE ORAL
Qty: 0 | Refills: 0 | DISCHARGE

## 2022-01-11 RX ORDER — GLIMEPIRIDE 1 MG
1.5 TABLET ORAL
Qty: 0 | Refills: 0 | DISCHARGE

## 2022-01-11 RX ORDER — GLIMEPIRIDE 1 MG
0 TABLET ORAL
Qty: 0 | Refills: 0 | DISCHARGE

## 2022-01-11 RX ADMIN — Medication 40 MILLIGRAM(S): at 05:03

## 2022-01-11 RX ADMIN — Medication 8 UNIT(S): at 08:00

## 2022-01-11 RX ADMIN — Medication 1 TABLET(S): at 11:41

## 2022-01-11 RX ADMIN — GABAPENTIN 100 MILLIGRAM(S): 400 CAPSULE ORAL at 05:03

## 2022-01-11 RX ADMIN — Medication 40 MILLIEQUIVALENT(S): at 11:41

## 2022-01-11 RX ADMIN — METFORMIN HYDROCHLORIDE 1000 MILLIGRAM(S): 850 TABLET ORAL at 05:03

## 2022-01-11 RX ADMIN — Medication 500 MILLIGRAM(S): at 05:03

## 2022-01-11 RX ADMIN — Medication 8 UNIT(S): at 11:41

## 2022-01-11 RX ADMIN — Medication 50 MILLIGRAM(S): at 05:03

## 2022-01-11 RX ADMIN — SODIUM CHLORIDE 3 MILLILITER(S): 9 INJECTION INTRAMUSCULAR; INTRAVENOUS; SUBCUTANEOUS at 05:03

## 2022-01-11 RX ADMIN — PANTOPRAZOLE SODIUM 40 MILLIGRAM(S): 20 TABLET, DELAYED RELEASE ORAL at 05:03

## 2022-01-11 RX ADMIN — Medication 325 MILLIGRAM(S): at 11:41

## 2022-01-11 RX ADMIN — AMIODARONE HYDROCHLORIDE 400 MILLIGRAM(S): 400 TABLET ORAL at 05:03

## 2022-01-11 NOTE — PROGRESS NOTE ADULT - ASSESSMENT
A/P    76 yr old male with PMH of HTN, HLD, Type 2 DM, CAD, former smoker here for elective cardiac cath for recent abnl stress with severe distal left main, RCA disease, s/p CABG    CAD, s/p CABG for severe LM disease  -cv stable, sitting up in chair   -Chest tube removed   -post op anemia improved s/p PRBC  -care per cticu   -cont asa/statin    New onset afib  -developed post-op  -rates mildly elevated but overall stable   -on amio load  -cont bb   -eventual AC per cts     ROSELIA  -po diuretics per cts  -cont to monitor     diabetes  - on metformin  - cont lantus  - insulin ss   - a1c 6.5    HLD  - c/w lipitor    constipation  - senna and miralax    dvt px  - lovenox      
A/P    76 yr old male with PMH of HTN, HLD, Type 2 DM, CAD, former smoker here for elective cardiac cath for recent abnl stress with severe distal left main, RCA disease, s/p CABG    #CAD, s/p CABG for severe LM disease  -cv stable, sitting up in chair   -Chest tube removed   -post op anemia improved s/p PRBC  -care per cticu   -cont asa/statin    #New onset afib  -developed post-op  -now in NSR   -on amio load  -cont bb   -eventual AC per cts     #ROSELIA  -po diuretics per cts  -cont to monitor     dvt ppx          
A/P    76 yr old male with PMH of HTN, HLD, Type 2 DM, CAD, former smoker here for elective cardiac cath for recent abnl stress with severe distal left main, RCA disease, s/p CABG    #CAD, s/p CABG for severe LM disease  -cv stable, sitting up in chair   -care per cticu   -cont asa/statin    #New onset afib  -developed post-op  -rates mildly elevated but overall stable   -on amio load  -cont bb   -eventual AC per cts     dvt ppx          
A/P    76 yr old male with PMH of HTN, HLD, Type 2 DM, CAD, former smoker here for elective cardiac cath for recent abnl stress with severe distal left main, RCA disease, s/p CABG    CAD, s/p CABG for severe LM disease  -cv stable,  -care per cticu   -cont asa/statin  - cont amio    diabetes  - on metformin  - cont lantus  - insulin ss   - a1c 6.5    HLD  - c/w lipitor    constipation  - senna and miralax    dvt px  - lovenox      
A/P    76 yr old male with PMH of HTN, HLD, Type 2 DM, CAD, former smoker here for elective cardiac cath for recent abnl stress with severe distal left main, RCA disease, s/p CABG    CAD, s/p CABG for severe LM disease  -cv stable, sitting up in chair   -Chest tube removed   -post op anemia improved s/p PRBC  -care per ct surg  -cont asa/statin    New onset afib  -developed post-op  -rates mildly elevated but overall stable   -on amio   -cont bb   -eventual AC per cts     ROSELIA  -po diuretics per cts  -cont to monitor     diabetes  - on metformin  - cont lantus  - insulin ss   - a1c 6.5    HLD  - c/w lipitor    constipation  - senna and miralax    dvt px  - lovenox    d/c planning as per CT surg      
A/P    76 yr old male with PMH of HTN, HLD, Type 2 DM, CAD, former smoker here for elective cardiac cath for recent abnl stress with severe distal left main, RCA disease, s/p CABG    #CAD, s/p CABG for severe LM disease  -cv stable, sitting up in chair   -Chest tube removed   -post op anemia improved s/p PRBC  -care per cticu   -cont asa/statin    #New onset afib  -developed post-op  -now in NSR   -on amio load  -cont bb   -eventual AC per cts     #ROSELIA  -po diuretics per cts  -cont to monitor     dvt ppx      DCP per primary team  pt to f/u Dr. Cordero upon DC     
A/P    76 yr old male with PMH of HTN, HLD, Type 2 DM, CAD, former smoker here for elective cardiac cath for recent abnl stress with severe distal left main, RCA disease, s/p CABG    #CAD, s/p CABG for severe LM disease  -cv stable, sitting up in chair   -Chest tube removed   -post op anemia improved s/p PRBC  -care per cticu   -cont asa/statin    #New onset afib  -developed post-op  -rates mildly elevated but overall stable   -on amio load  -cont bb   -eventual AC per cts     #ROSELIA  -po diuretics per cts  -cont to monitor     dvt ppx          
A/P    76 yr old male with PMH of HTN, HLD, Type 2 DM, CAD, former smoker here for elective cardiac cath for recent abnl stress with severe distal left main, RCA disease, s/p CABG    CAD, s/p CABG for severe LM disease  -cv stable, sitting up in chair   -Chest tube removed   -post op anemia improved s/p PRBC  -care per cticu   -cont asa/statin    New onset afib  -developed post-op  -rates mildly elevated but overall stable   -on amio load  -cont bb   -eventual AC per cts     ROSELIA  -po diuretics per cts  -cont to monitor     diabetes  - on metformin  - cont lantus  - insulin ss   - a1c 6.5    HLD  - c/w lipitor    constipation  - senna and miralax    dvt px  - lovenox      
77 y/o former smoker M with PMHx of HTN, HLD, Type 2 DM, and CAD initially presented s/p abnormal strep test. Pt now presents for further ischemic workup. Of note, patient recently lost wife to pancreatic cancer and son to COVID.    1/6 s/p C3L w/ pre-op R femoral IABP. Required 2u platelets & 5u Cryo intraop. Received to CTU on Levo, Vaso, and insulin gtts. Extubated at 21:10.   1/7 IABP d/c'd. Levo & Vaso gtts d/c'd.  1/8 Pt w/ new onset afib requiring PO amio load - pt has since converted to NSR. Received 1u PRBC overnight. Insulin gtt d/c'd. Lasix 40 mg PO QD initiated.   1/9 Lopressor increased to 50 mg BID and Metformin 1000mg BID initiated in CTU this AM. Pt received to floor - VSS, NAD. Net negative 1.6L on Lasix 40 mg PO QD. Maintain PW VVI 40/10 for now. Continue current medication regimen. 
75 y/o former smoker M with PMHx of HTN, HLD, Type 2 DM, and CAD initially presented s/p abnormal strep test. Pt now presents for further ischemic workup. Of note, patient recently lost wife to pancreatic cancer and son to COVID.    1/6 s/p C3L w/ pre-op R femoral IABP. Required 2u platelets & 5u Cryo intraop. Received to CTU on Levo, Vaso, and insulin gtts. Extubated at 21:10.   1/7 IABP d/c'd. Levo & Vaso gtts d/c'd.  1/8 Pt w/ new onset afib requiring PO amio load - pt has since converted to NSR. Received 1u PRBC overnight. Insulin gtt d/c'd. Lasix 40 mg PO QD initiated.   1/9 Lopressor increased to 50 mg BID and Metformin 1000mg BID initiated in CTU this AM. Pt received to floor - VSS, NAD. Net negative 1.6L on Lasix 40 mg PO QD. Maintain PW VVI 40/10 for now. Continue current medication regimen.   1/10 VSS; rsr ; increase bb for hr control; isolate pw; pt--> stairs  Discharge planning- home likely tue

## 2022-01-11 NOTE — CONSULT NOTE ADULT - ASSESSMENT
Assessment  DMT2: 76y Male with DM T2 with hyperglycemia admitted with chest pain, patient was on oral hypoglycemic agents at home, now on insulin blood sugars improving,  eating meals,  compliant with low carb diet.  Patient is high risk with high level decision making due to uncontrolled diabetes, has increased risk for complications.  CAD: S/P CABG, on medications, monitored, asymptomatic.  HTN: On antihypertensive medications, monitored, asymptomatic.  Overweight: No strict exercise routines, neither on low calorie diet.                 Mireya Olvera MD  Cell: [phone]  Office: [phone]

## 2022-01-11 NOTE — PROGRESS NOTE ADULT - PROVIDER SPECIALTY LIST ADULT
CT Surgery
Cardiology
Critical Care
Critical Care
Internal Medicine
Cardiology
Critical Care
Critical Care
Internal Medicine
Cardiology
Cardiology
Critical Care
Internal Medicine
Internal Medicine
CT Surgery

## 2022-01-11 NOTE — CONSULT NOTE ADULT - PROBLEM SELECTOR RECOMMENDATION 9
Will continue current insulin regimen for now. Will continue monitoring  blood sugars, will Follow up.  Patient does not want to change his DM meds, talked to his Endo today, will FU. May get DC home on home DM meds  Patient counseled for compliance with consistent low carb diet.

## 2022-01-11 NOTE — PROGRESS NOTE ADULT - TIME BILLING
Agree with above NP note.  cv stable   paf  cont bb, amio per cts  a/c per cts
Agree with above NP note.  cv stable  cont current tx  cont bb  care per cts
Agree with above NP note.  cv stable   paf  cont bb, amio per cts  a/c per cts
Agree with above NP note.  cv stable  cont current tx  cont bb  care per cts

## 2022-01-11 NOTE — DISCHARGE NOTE PROVIDER - NSDCCPCAREPLAN_GEN_ALL_CORE_FT
PRINCIPAL DISCHARGE DIAGNOSIS  Diagnosis: CAD, multiple vessel  Assessment and Plan of Treatment: sp   cabg   shower daily

## 2022-01-11 NOTE — DISCHARGE NOTE PROVIDER - HOSPITAL COURSE
· Assessment    75 y/o former smoker M with PMHx of HTN, HLD, Type 2 DM, and CAD initially presented s/p abnormal strep test. Pt now presents for further ischemic workup. Of note, patient recently lost wife to pancreatic cancer and son to COVID.    1/6 s/p C3L w/ pre-op R femoral IABP. Required 2u platelets & 5u Cryo intraop. Received to CTU on Levo, Vaso, and insulin gtts. Extubated at 21:10.   1/7 IABP d/c'd. Levo & Vaso gtts d/c'd.  1/8 Pt w/ new onset afib requiring PO amio load - pt has since converted to NSR. Received 1u PRBC overnight. Insulin gtt d/c'd. Lasix 40 mg PO QD initiated.   1/9 Lopressor increased to 50 mg BID and Metformin 1000mg BID initiated in CTU this AM. Pt received to floor - VSS, NAD. Net negative 1.6L on Lasix 40 mg PO QD. Maintain PW VVI 40/10 for now. Continue current medication regimen.   1/10 VSS; rsr ; increase bb for hr control; isolate pw; pt--> stairs  Discharge planning- home likely tue   1/11    pw  out   dc home

## 2022-01-11 NOTE — PROGRESS NOTE ADULT - SUBJECTIVE AND OBJECTIVE BOX
DATE OF SERVICE: 01-11-22 @ 11:34    Patient is a 76y old  Male who presents with a chief complaint of sp   CABG (11 Jan 2022 10:42)      SUBJECTIVE / OVERNIGHT EVENTS:  No chest pain. No shortness of breath. No complaints. No events overnight.     MEDICATIONS  (STANDING):  aMIOdarone    Tablet 400 milliGRAM(s) Oral every 8 hours  aMIOdarone    Tablet   Oral   aspirin enteric coated 325 milliGRAM(s) Oral daily  atorvastatin 80 milliGRAM(s) Oral at bedtime  bisacodyl Suppository 10 milliGRAM(s) Rectal once  dextrose 40% Gel 15 Gram(s) Oral once  dextrose 5%. 1000 milliLiter(s) (50 mL/Hr) IV Continuous <Continuous>  dextrose 50% Injectable 50 milliLiter(s) IV Push every 15 minutes  enoxaparin Injectable 40 milliGRAM(s) SubCutaneous daily  glucagon  Injectable 1 milliGRAM(s) IntraMuscular once  insulin glargine Injectable (LANTUS) 24 Unit(s) SubCutaneous at bedtime  insulin lispro (ADMELOG) corrective regimen sliding scale   SubCutaneous at bedtime  insulin lispro (ADMELOG) corrective regimen sliding scale   SubCutaneous three times a day before meals  insulin lispro Injectable (ADMELOG) 8 Unit(s) SubCutaneous three times a day before meals  metoprolol tartrate 50 milliGRAM(s) Oral every 8 hours  multivitamin 1 Tablet(s) Oral daily  pantoprazole    Tablet 40 milliGRAM(s) Oral before breakfast  polyethylene glycol 3350 17 Gram(s) Oral daily  potassium chloride    Tablet ER 40 milliEquivalent(s) Oral daily  senna 2 Tablet(s) Oral at bedtime  sodium chloride 0.9% lock flush 3 milliLiter(s) IV Push every 8 hours    MEDICATIONS  (PRN):  acetaminophen     Tablet .. 650 milliGRAM(s) Oral every 6 hours PRN Mild Pain (1 - 3)  oxyCODONE    IR 5 milliGRAM(s) Oral every 4 hours PRN Moderate Pain (4 - 6)  oxyCODONE    IR 10 milliGRAM(s) Oral every 4 hours PRN Severe Pain (7 - 10)      Vital Signs Last 24 Hrs  T(C): 36.8 (11 Jan 2022 04:02), Max: 36.8 (11 Jan 2022 04:02)  T(F): 98.2 (11 Jan 2022 04:02), Max: 98.2 (11 Jan 2022 04:02)  HR: 72 (11 Jan 2022 04:02) (72 - 86)  BP: 121/68 (11 Jan 2022 04:02) (106/68 - 121/68)  BP(mean): 86 (11 Jan 2022 04:02) (86 - 86)  RR: 18 (11 Jan 2022 04:02) (18 - 18)  SpO2: 94% (11 Jan 2022 04:02) (94% - 95%)  CAPILLARY BLOOD GLUCOSE      POCT Blood Glucose.: 109 mg/dL (11 Jan 2022 07:42)  POCT Blood Glucose.: 117 mg/dL (10 Rob 2022 22:07)  POCT Blood Glucose.: 146 mg/dL (10 Rob 2022 17:34)    I&O's Summary    10 Rob 2022 07:01  -  11 Jan 2022 07:00  --------------------------------------------------------  IN: 930 mL / OUT: 1150 mL / NET: -220 mL    11 Jan 2022 07:01  -  11 Jan 2022 11:34  --------------------------------------------------------  IN: 360 mL / OUT: 0 mL / NET: 360 mL        PHYSICAL EXAM:  GENERAL: NAD, well-developed  HEAD:  Atraumatic, Normocephalic  EYES: EOMI, PERRLA, conjunctiva and sclera clear  NECK: Supple, No JVD  CHEST/LUNG: Clear to auscultation bilaterally; No wheeze  HEART: Regular rate and rhythm; No murmurs, rubs, or gallops  ABDOMEN: Soft, Nontender, Nondistended; Bowel sounds present  EXTREMITIES:  2+ Peripheral Pulses, No clubbing, cyanosis, or edema  PSYCH: AAOx3  NEUROLOGY: non-focal  SKIN: No rashes or lesions    LABS:                        9.7    9.59  )-----------( 301      ( 11 Jan 2022 05:38 )             29.9     01-11    140  |  105  |  43<H>  ----------------------------<  103<H>  4.2   |  23  |  1.38<H>    Ca    9.0      11 Jan 2022 05:38                RADIOLOGY & ADDITIONAL TESTS:    Imaging Personally Reviewed:    Consultant(s) Notes Reviewed:      Care Discussed with Consultants/Other Providers:

## 2022-01-11 NOTE — DISCHARGE NOTE PROVIDER - NSDCMRMEDTOKEN_GEN_ALL_CORE_FT
Aspirin Enteric Coated 81 mg oral delayed release tablet: 1 tab(s) orally once a day  atorvastatin 40 mg oral tablet: 1 tab(s) orally once a day  glimepiride: patient unsure of dose  metFORMIN 1000 mg oral tablet: 1 tab(s) orally 2 times a day  olmesartan-hydrochlorothiazide 40 mg-25 mg oral tablet: 1 tab(s) orally once a day  Trulicity Pen 0.75 mg/0.5 mL subcutaneous solution: once a week on Saturday  Vitamin B-12: 3000 milligram(s) orally once a day  Vitamin D3 50 mcg (2000 intl units) oral capsule: 1 cap(s) orally once a day   acetaminophen: 2 tab(s) orally every 6 hours, As Needed  amiodarone 200 mg oral tablet: 1 tab(s) orally once a day   Aspirin Enteric Coated 81 mg oral delayed release tablet: 1 tab(s) orally once a day  atorvastatin 40 mg oral tablet: 1 tab(s) orally once a day  glimepiride: 1.5 cap(s) orally once a day  Continue   Prior Hospital dose   metFORMIN 1000 mg oral tablet: 1 tab(s) orally 2 times a day  Multiple Vitamins oral tablet: 1 tab(s) orally once a day  oxyCODONE 5 mg oral tablet: 1 tab(s) orally every 4 hours, As needed, Moderate Pain (4 - 6) MDD:8  may   take 2 tabs for severe pain  polyethylene glycol 3350 oral powder for reconstitution: 17 gram(s) orally once a day  senna oral tablet: 2 tab(s) orally once a day (at bedtime)  Toprol- mg oral tablet, extended release: 1 tab(s) orally 2 times a day   Trulicity Pen 0.75 mg/0.5 mL subcutaneous solution: once a week on Saturday  Vitamin B-12: 3000 milligram(s) orally once a day  Vitamin D3 50 mcg (2000 intl units) oral capsule: 1 cap(s) orally once a day

## 2022-01-11 NOTE — DISCHARGE NOTE PROVIDER - NSDCQMSTAIRS_GEN_ALL_CORE
[facility]  Hospitalist Progress Note  Nelson Worthy MD 04/11/2017    Reason for Stay (Diagnosis): PNA         Assessment and Plan:      Summary of Stay: Donald Raza is a 69 year old male with a history of DM2, HIV, TIA, ESRD on HD, CAD, HTN admitted on 3/28/2017 with fever, cough, and weakness ultimately dx with influenza and PNA and started on tamiflu and vanco/zosyn, respectively. Has improved from a respiratory standpoint, antibiotics have been stopped. Heme also consulted for recurrent anemia and TCP for which he has 6 units during hospital stay. No clinical e/o GI bleed and no e/o hemolysis based on lab analysis. Course complicated by uncontrolled DM requiring insulin gtt at one point; insulin doses increased and prednisone being weaned with subsequent improvement of BS.  Anemia is stable off ASA.  Looks appropriate to go home today      Problem List:   1. Influenza A: completed course of Tamiflu.  2. Pneumonia: ID was following. Zosyn stopped 4/5. Vanco stopped 4/3. Procalcitonin on 4/6 moderately elevated, but improved from previous. CXR from 4/7 with findings unchanged from previous. Off antibiotics   3. Bronchospasm. Due to recent Influenza and Pneumonia. Resolved. Stop prednisone  4. Deconditioning. PT and OT consults.  5. ESRD on HD qTRS s/p failed transplant: Dialysis per Nephrology today - next on thursday. Continue Cellcept.  6. Acute on chronic anemia: No obvious bleeding. No e/o hemolysis. Recent EGD done 2/17 showed no pathology. Unclear etiology, likely multifactorial - heme input appreciated. Recieved total of 6 U PRBC this admit. Stop ASA. contiue PPI. Recheck hgb as outpt  7. CAD s/p PCI with stenting (within the past year): Stable, no CP. On DAP. Continue plavix, atorvastatin 40, coreg 12.5 bid, imdur 60 daily. Stop ASA due to continued need for transfusions.  8. HIV: Appreciate ID input. Not active issue. Continue PTA norvir, tivicay, prezista, dapsone.  9. Hx of  "TIA: Stable, continue atorvastatin and plavix.  10. HTN: Continue Norvasc, coreg, hydralazine 25 tid, imdur, losartan 100.  11. DM: much better controlled now with prednisone dose decreased and insulin adjusted    HOME today.  >30 min spent on discharge today          DVT Prophylaxis: Pneumatic Compression Devices  Code Status: Full Code  Discharge Dispo: home  Estimated Disch Date / # of Days until Disch: today        Interval History (Subjective):      On complaint is persistent cough.  O/w feels well                  Physical Exam:      Last Vital Signs:  /64  Pulse 82  Temp 97.1  F (36.2  C) (Oral)  Resp 20  Ht 1.803 m (5' 11\")  Wt 96.1 kg (211 lb 12.8 oz)  SpO2 96%  BMI 29.54 kg/m2      Intake/Output Summary (Last 24 hours) at 04/11/17 1314  Last data filed at 04/11/17 1145   Gross per 24 hour   Intake              853 ml   Output             3150 ml   Net            -2297 ml       Constitutional: Awake, alert, cooperative, no apparent distress   Respiratory: Clear to auscultation bilaterally, no crackles or wheezing   Cardiovascular: Regular rate and rhythm, normal S1 and S2, and no murmur noted   Abdomen: Normal bowel sounds, soft, non-distended, non-tender   Skin: No rashes, no cyanosis, dry to touch   Neuro: Alert and oriented x3, no weakness, numbness, memory loss   Extremities: No edema, normal range of motion   Other(s):        All other systems: Negative          Medications:      All current medications were reviewed with changes reflected in problem list.         Data:      All new lab and imaging data was reviewed.   Labs:    Recent Labs  Lab 04/11/17  0628 04/10/17  0729 04/09/17  0620    138 138   POTASSIUM 4.8 4.5 4.7   CHLORIDE 101 99 99   CO2 24 27 27   ANIONGAP 14 12 12   * 89 264*   * 120* 84*   CR 8.39* 6.95* 5.15*   GFRESTIMATED 6* 8* 11*   GFRESTBLACK 8* 10* 14*   PRABHA 9.0 9.3 9.4   MAG  --   --  2.6*   PHOS 4.9* 4.2  --    ALBUMIN 2.8* 2.8*  --    BILITOTAL  " --   --  0.3      Imaging:   No results found for this or any previous visit (from the past 24 hour(s)).    Yes

## 2022-01-11 NOTE — DISCHARGE NOTE PROVIDER - CARE PROVIDER_API CALL
Rosas Mulligan (MD)  Surgery; Surgical Critical Care; Thoracic and Cardiac Surgery  [address] [place] [address]  Phone: [phone]  Fax: [phone]  Follow Up Time:

## 2022-01-11 NOTE — CONSULT NOTE ADULT - SUBJECTIVE AND OBJECTIVE BOX
76 yr old male with PMH of HTN, HLD, Type 2 DM, CAD, former smoker presented to Dr. Augusto Cordero for follow up and underwent stress test which showed inferior, inferolateral, and septal ischemia, EF 60%. Patient presents today for further ischemic workup. Denies any chest pain or shortness of breath today.  Of note, patient recently lost wife to pancreatic cancer and son to COVID.     01-07-22 @ 19:08  PAST MEDICAL & SURGICAL HISTORY:  HTN (hypertension)    Diabetes    Hyperlipidemia    CAD (coronary artery disease)    History of tonsillectomy        Review of Systems:   CONSTITUTIONAL: No fever, weight loss, or fatigue  EYES: No eye pain, visual disturbances, or discharge  ENMT:  No difficulty hearing, tinnitus, vertigo; No sinus or throat pain  NECK: No pain or stiffness  BREASTS: No pain, masses, or nipple discharge  RESPIRATORY: No cough, wheezing, chills or hemoptysis; No shortness of breath  CARDIOVASCULAR: No chest pain, palpitations, dizziness, or leg swelling  GASTROINTESTINAL: No abdominal or epigastric pain. No nausea, vomiting, or hematemesis; No diarrhea or constipation. No melena or hematochezia.  GENITOURINARY: No dysuria, frequency, hematuria, or incontinence  NEUROLOGICAL: No headaches, memory loss, loss of strength, numbness, or tremors  SKIN: No itching, burning, rashes, or lesions   LYMPH NODES: No enlarged glands  ENDOCRINE: No heat or cold intolerance; No hair loss  MUSCULOSKELETAL: No joint pain or swelling; No muscle, back, or extremity pain  PSYCHIATRIC: No depression, anxiety, mood swings, or difficulty sleeping  HEME/LYMPH: No easy bruising, or bleeding gums  ALLERY AND IMMUNOLOGIC: No hives or eczema    Allergies    No Known Allergies    Intolerances        Social History:     FAMILY HISTORY:      MEDICATIONS  (STANDING):  acetaminophen     Tablet .. 650 milliGRAM(s) Oral every 6 hours  ascorbic acid 500 milliGRAM(s) Oral two times a day  aspirin enteric coated 325 milliGRAM(s) Oral daily  atorvastatin 80 milliGRAM(s) Oral at bedtime  cefuroxime  IVPB 1500 milliGRAM(s) IV Intermittent every 8 hours  chlorhexidine 2% Cloths 1 Application(s) Topical daily  dextrose 40% Gel 15 Gram(s) Oral once  dextrose 5%. 1000 milliLiter(s) (50 mL/Hr) IV Continuous <Continuous>  dextrose 5%. 1000 milliLiter(s) (100 mL/Hr) IV Continuous <Continuous>  dextrose 50% Injectable 50 milliLiter(s) IV Push every 15 minutes  enoxaparin Injectable 40 milliGRAM(s) SubCutaneous daily  gabapentin 100 milliGRAM(s) Oral every 8 hours  glucagon  Injectable 1 milliGRAM(s) IntraMuscular once  insulin glargine Injectable (LANTUS) 5 Unit(s) SubCutaneous at bedtime  insulin lispro (ADMELOG) corrective regimen sliding scale   SubCutaneous three times a day before meals  insulin lispro (ADMELOG) corrective regimen sliding scale   SubCutaneous at bedtime  insulin lispro Injectable (ADMELOG) 3 Unit(s) SubCutaneous three times a day before meals  insulin regular Infusion 3 Unit(s)/Hr (3 mL/Hr) IV Continuous <Continuous>  metoclopramide Injectable 10 milliGRAM(s) IV Push every 8 hours  metoprolol tartrate 12.5 milliGRAM(s) Oral every 12 hours  multivitamin 1 Tablet(s) Oral daily  norepinephrine Infusion 0.03 MICROgram(s)/kG/Min (5.1 mL/Hr) IV Continuous <Continuous>  pantoprazole    Tablet 40 milliGRAM(s) Oral before breakfast  polyethylene glycol 3350 17 Gram(s) Oral daily  potassium chloride  10 mEq/50 mL IVPB 10 milliEquivalent(s) IV Intermittent every 1 hour  potassium chloride  10 mEq/50 mL IVPB 10 milliEquivalent(s) IV Intermittent every 1 hour  potassium chloride  10 mEq/50 mL IVPB 10 milliEquivalent(s) IV Intermittent every 1 hour  senna 2 Tablet(s) Oral at bedtime  sodium chloride 0.9%. 1000 milliLiter(s) (10 mL/Hr) IV Continuous <Continuous>  vasopressin Infusion 0.06 Unit(s)/Min (3.6 mL/Hr) IV Continuous <Continuous>    MEDICATIONS  (PRN):  HYDROmorphone  Injectable 0.5 milliGRAM(s) IV Push every 6 hours PRN Severe Pain (7 - 10)  oxyCODONE    IR 5 milliGRAM(s) Oral every 4 hours PRN Moderate Pain (4 - 6)  oxyCODONE    IR 10 milliGRAM(s) Oral every 4 hours PRN Severe Pain (7 - 10)      Vital Signs Last 24 Hrs  T(C): 37.2 (07 Jan 2022 16:00), Max: 37.2 (07 Jan 2022 16:00)  T(F): 99 (07 Jan 2022 16:00), Max: 99 (07 Jan 2022 16:00)  HR: 93 (07 Jan 2022 18:00) (75 - 109)  BP: 101/59 (07 Jan 2022 18:00) (85/52 - 121/61)  BP(mean): 76 (07 Jan 2022 18:00) (65 - 85)  RR: 25 (07 Jan 2022 18:00) (12 - 44)  SpO2: 98% (07 Jan 2022 18:00) (83% - 100%)  CAPILLARY BLOOD GLUCOSE  194 (07 Jan 2022 19:00)  166 (07 Jan 2022 18:00)  138 (07 Jan 2022 17:00)  154 (07 Jan 2022 16:00)  184 (07 Jan 2022 14:00)  198 (07 Jan 2022 13:00)  226 (07 Jan 2022 12:00)  226 (07 Jan 2022 11:00)  232 (07 Jan 2022 10:00)      POCT Blood Glucose.: 194 mg/dL (07 Jan 2022 18:48)  POCT Blood Glucose.: 166 mg/dL (07 Jan 2022 17:53)  POCT Blood Glucose.: 138 mg/dL (07 Jan 2022 16:52)  POCT Blood Glucose.: 154 mg/dL (07 Jan 2022 16:08)  POCT Blood Glucose.: 178 mg/dL (07 Jan 2022 15:05)  POCT Blood Glucose.: 184 mg/dL (07 Jan 2022 13:55)  POCT Blood Glucose.: 198 mg/dL (07 Jan 2022 13:24)  POCT Blood Glucose.: 226 mg/dL (07 Jan 2022 11:58)  POCT Blood Glucose.: 226 mg/dL (07 Jan 2022 10:49)  POCT Blood Glucose.: 232 mg/dL (07 Jan 2022 09:44)  POCT Blood Glucose.: 135 mg/dL (07 Jan 2022 00:04)  POCT Blood Glucose.: 136 mg/dL (06 Jan 2022 22:57)  POCT Blood Glucose.: 133 mg/dL (06 Jan 2022 22:13)  POCT Blood Glucose.: 138 mg/dL (06 Jan 2022 20:49)  POCT Blood Glucose.: 142 mg/dL (06 Jan 2022 20:01)    I&O's Summary    06 Jan 2022 07:01  -  07 Jan 2022 07:00  --------------------------------------------------------  IN: 4532.7 mL / OUT: 2720 mL / NET: 1812.7 mL    07 Jan 2022 07:01  -  07 Jan 2022 19:08  --------------------------------------------------------  IN: 1052.7 mL / OUT: 890 mL / NET: 162.7 mL        PHYSICAL EXAM:  GENERAL: NAD, well-developed  HEAD:  Atraumatic, Normocephalic  EYES: EOMI, PERRLA, conjunctiva and sclera clear  NECK: Supple, No JVD  CHEST/LUNG: Clear to auscultation bilaterally; No wheeze  HEART: Regular rate and rhythm; No murmurs, rubs, or gallops  ABDOMEN: Soft, Nontender, Nondistended; Bowel sounds present  EXTREMITIES:  2+ Peripheral Pulses, No clubbing, cyanosis, or edema  PSYCH: AAOx3  NEUROLOGY: non-focal  SKIN: No rashes or lesions    LABS:                        8.6    7.96  )-----------( 186      ( 07 Jan 2022 02:03 )             25.7     01-07    142  |  109<H>  |  24<H>  ----------------------------<  119<H>  4.5   |  22  |  1.09    Ca    8.0<L>      07 Jan 2022 02:03  Phos  3.2     01-07  Mg     2.7     01-07    TPro  5.4<L>  /  Alb  3.6  /  TBili  0.8  /  DBili  x   /  AST  32  /  ALT  23  /  AlkPhos  40  01-07    PT/INR - ( 07 Jan 2022 02:03 )   PT: 13.3 sec;   INR: 1.11 ratio         PTT - ( 07 Jan 2022 02:03 )  PTT:26.2 sec  CARDIAC MARKERS ( 07 Jan 2022 02:03 )  x     / x     / 358 U/L / x     / 11.1 ng/mL  CARDIAC MARKERS ( 06 Jan 2022 14:29 )  x     / x     / 290 U/L / x     / 16.8 ng/mL          RADIOLOGY & ADDITIONAL TESTS:    Imaging Personally Reviewed:    Consultant(s) Notes Reviewed:      Care Discussed with Consultants/Other Providers:  
CARDIOLOGY CONSULT - Dr. Masters         HPI:  76 yr old male with PMH of HTN, HLD, Type 2 DM, CAD, former smoker presented to Dr. Augusto Cordero for follow up and underwent stress test which showed inferior, inferolateral, and septal ischemia, EF 60%. Patient presents today for further ischemic workup. Denies any chest pain or shortness of breath today.  Of note, patient recently lost wife to pancreatic cancer and son to COVID.  (05 Jan 2022 09:28)  ROS otherwise neg       PAST MEDICAL & SURGICAL HISTORY:  HTN (hypertension)    Diabetes    Hyperlipidemia    CAD (coronary artery disease)    History of tonsillectomy            PREVIOUS DIAGNOSTIC TESTING:    [ ] Echocardiogram:  [ ]  Catheterization:   [ ] Stress Test:  	    MEDICATIONS:  Home Medications:  Aspirin Enteric Coated 81 mg oral delayed release tablet: 1 tab(s) orally once a day (05 Jan 2022 11:36)  atorvastatin 40 mg oral tablet: 1 tab(s) orally once a day (05 Jan 2022 11:36)  glimepiride: patient unsure of dose (05 Jan 2022 11:36)  metFORMIN 1000 mg oral tablet: 1 tab(s) orally 2 times a day (05 Jan 2022 11:36)  olmesartan-hydrochlorothiazide 40 mg-25 mg oral tablet: 1 tab(s) orally once a day (05 Jan 2022 11:36)  Trulicity Pen 0.75 mg/0.5 mL subcutaneous solution: once a week on Saturday (05 Jan 2022 11:36)  Vitamin B-12: 3000 milligram(s) orally once a day (05 Jan 2022 11:36)  Vitamin D3 50 mcg (2000 intl units) oral capsule: 1 cap(s) orally once a day (05 Jan 2022 11:36)      MEDICATIONS  (STANDING):  sodium chloride 0.9% Bolus 250 milliLiter(s) IV Bolus once      FAMILY HISTORY:      SOCIAL HISTORY:    [x ] Non-smoker - former smoker   [ ] Smoker  [ ] Alcohol    Allergies    No Known Allergies    Intolerances    	    REVIEW OF SYSTEMS:  CONSTITUTIONAL: No fever, weight loss, or fatigue  EYES: No eye pain, visual disturbances, or discharge  ENMT:  No difficulty hearing, tinnitus, vertigo; No sinus or throat pain  NECK: No pain or stiffness  RESPIRATORY: No cough, wheezing, chills or hemoptysis; No Shortness of Breath  CARDIOVASCULAR: No chest pain, palpitations, passing out, dizziness, or leg swelling  GASTROINTESTINAL: No abdominal or epigastric pain. No nausea, vomiting, or hematemesis; No diarrhea or constipation. No melena or hematochezia.  GENITOURINARY: No dysuria, frequency, hematuria, or incontinence  NEUROLOGICAL: No headaches, memory loss, loss of strength, numbness, or tremors  SKIN: No itching, burning, rashes, or lesions   	    [ x] All others negative see hpi 	  [ ] Unable to obtain    PHYSICAL EXAM:  T(C): 36.7 (01-05-22 @ 11:45), Max: 36.7 (01-05-22 @ 09:25)  HR: 97 (01-05-22 @ 12:15) (95 - 110)  BP: 155/81 (01-05-22 @ 09:25) (155/81 - 155/81)  RR: 16 (01-05-22 @ 12:15) (16 - 16)  SpO2: 96% (01-05-22 @ 12:15) (93% - 98%)  Wt(kg): --  I&O's Summary      Appearance: Normal	  Psychiatry: A & O x 3, Mood & affect appropriate  HEENT:   Normal oral mucosa, PERRL, EOMI	  Lymphatic: No lymphadenopathy  Cardiovascular: Normal S1 S2,RRR, No JVD, No murmurs  Respiratory: Lungs clear to auscultation	  Gastrointestinal:  Soft, Non-tender, + BS	  Skin: No rashes, No ecchymoses, No cyanosis	  Neurologic: Non-focal  Extremities: Normal range of motion, No clubbing, cyanosis or edema  Vascular: Peripheral pulses palpable 2+ bilaterally    TELEMETRY: 	    ECG:  	- no acute ischemic changes   RADIOLOGY:  OTHER: 	  	  LABS:	 	    CARDIAC MARKERS:                                  14.1   7.30  )-----------( 295      ( 05 Jan 2022 09:56 )             43.5     01-05    141  |  103  |  39<H>  ----------------------------<  163<H>  4.4   |  25  |  1.38<H>    Ca    10.5      05 Jan 2022 09:56        proBNP:   Lipid Profile:   HgA1c:   TSH:       
CARDIOLOGY CONSULT NOTE - DR. WHITTINGTON    HPI:  76 yr old male with PMH of HTN, HLD, Type 2 DM, CAD, former smoker presented to Dr. Augusto Cordero for follow up and underwent stress test which showed inferior, inferolateral, and septal ischemia, EF 60%. Patient presents today for further ischemic workup. Denies any chest pain or shortness of breath today.  Of note, patient recently lost wife to pancreatic cancer and son to COVID.  (2022 09:28)        PAST MEDICAL & SURGICAL HISTORY:  HTN (hypertension)    Diabetes    Hyperlipidemia    CAD (coronary artery disease)    History of tonsillectomy          PREVIOUS DIAGNOSTIC TESTING:    [ ] Echocardiogram:  [ ]  Catheterization:  [ ] Stress Test:  	    MEDICATIONS:    Home Medications:  Aspirin Enteric Coated 81 mg oral delayed release tablet: 1 tab(s) orally once a day (2022 11:36)  atorvastatin 40 mg oral tablet: 1 tab(s) orally once a day (2022 11:36)  glimepiride: patient unsure of dose (2022 11:36)  metFORMIN 1000 mg oral tablet: 1 tab(s) orally 2 times a day (2022 11:36)  olmesartan-hydrochlorothiazide 40 mg-25 mg oral tablet: 1 tab(s) orally once a day (2022 11:36)  Trulicity Pen 0.75 mg/0.5 mL subcutaneous solution: once a week on Saturday (2022 11:36)  Vitamin B-12: 3000 milligram(s) orally once a day (2022 11:36)  Vitamin D3 50 mcg (2000 intl units) oral capsule: 1 cap(s) orally once a day (2022 11:36)      MEDICATIONS  (STANDING):  acetaminophen     Tablet .. 650 milliGRAM(s) Oral every 6 hours  ascorbic acid 500 milliGRAM(s) Oral two times a day  aspirin enteric coated 325 milliGRAM(s) Oral daily  atorvastatin 80 milliGRAM(s) Oral at bedtime  cefuroxime  IVPB 1500 milliGRAM(s) IV Intermittent every 8 hours  chlorhexidine 2% Cloths 1 Application(s) Topical daily  dextrose 50% Injectable 50 milliLiter(s) IV Push every 15 minutes  enoxaparin Injectable 40 milliGRAM(s) SubCutaneous daily  gabapentin 100 milliGRAM(s) Oral every 8 hours  insulin regular Infusion 3 Unit(s)/Hr (3 mL/Hr) IV Continuous <Continuous>  metoclopramide Injectable 10 milliGRAM(s) IV Push every 8 hours  multivitamin 1 Tablet(s) Oral daily  norepinephrine Infusion 0.03 MICROgram(s)/kG/Min (5.1 mL/Hr) IV Continuous <Continuous>  pantoprazole    Tablet 40 milliGRAM(s) Oral before breakfast  polyethylene glycol 3350 17 Gram(s) Oral daily  potassium chloride  10 mEq/50 mL IVPB 10 milliEquivalent(s) IV Intermittent every 1 hour  potassium chloride  10 mEq/50 mL IVPB 10 milliEquivalent(s) IV Intermittent every 1 hour  potassium chloride  10 mEq/50 mL IVPB 10 milliEquivalent(s) IV Intermittent every 1 hour  senna 2 Tablet(s) Oral at bedtime  sodium chloride 0.9%. 1000 milliLiter(s) (10 mL/Hr) IV Continuous <Continuous>  vasopressin Infusion 0.06 Unit(s)/Min (3.6 mL/Hr) IV Continuous <Continuous>      FAMILY HISTORY:      SOCIAL HISTORY:    [ ] Non-smoker  [ ] Smoker  [ ] Alcohol    Allergies    No Known Allergies    Intolerances    	    REVIEW OF SYSTEMS:  CONSTITUTIONAL: No fever, weight loss, or fatigue  EYES: No eye pain, visual disturbances, or discharge  ENMT:  No difficulty hearing, tinnitus, vertigo; No sinus or throat pain  NECK: No pain or stiffness  RESPIRATORY: No cough, wheezing, chills or hemoptysis; No Shortness of Breath  CARDIOVASCULAR: as HPI  GASTROINTESTINAL: No abdominal or epigastric pain. No nausea, vomiting, or hematemesis; No diarrhea or constipation. No melena or hematochezia.  GENITOURINARY: No dysuria, frequency, hematuria, or incontinence  NEUROLOGICAL: No headaches, memory loss, loss of strength, numbness, or tremors  SKIN: No itching, burning, rashes, or lesions   	  [ ] All others negative	  [ ] Unable to obtain    PHYSICAL EXAM:    T(C): 37 (22 @ 12:00), Max: 37.1 (22 @ 21:00)  HR: 94 (22 @ 14:48) (75 - 109)  BP: 110/55 (22 @ 14:48) (85/52 - 121/61)  RR: 29 (22 @ 14:48) (10 - 44)  SpO2: 97% (22 @ 14:48) (83% - 100%)  Wt(kg): --  I&O's Summary    2022 07:01  -  2022 07:00  --------------------------------------------------------  IN: 4532.7 mL / OUT: 2720 mL / NET: 1812.7 mL    2022 07:01  -  2022 15:27  --------------------------------------------------------  IN: 976.7 mL / OUT: 620 mL / NET: 356.7 mL      Daily     Daily Weight in k.1 (2022 01:00)    Appearance: Normal	  Psychiatry: A & O x 3, Mood & affect appropriate  HEENT:   Normal oral mucosa, PERRL, EOMI	  Lymphatic: No lymphadenopathy  Cardiovascular: Normal S1 S2,RRR, No JVD, No murmurs  Respiratory: Lungs clear to auscultation	  Gastrointestinal:  Soft, Non-tender, + BS	  Skin: No rashes, No ecchymoses, No cyanosis	  Neurologic: Non-focal  Extremities: Normal range of motion, No clubbing, cyanosis or edema  Vascular: Peripheral pulses palpable 2+ bilaterally    TELEMETRY: 	    ECG:  	  RADIOLOGY:  OTHER: 	  	  LABS:	 	    CARDIAC MARKERS:        proBNP:     Lipid Profile:   HgA1c:   TSH: Thyroid Stimulating Hormone, Serum: 0.85 uIU/mL (22 @ 21:27)                            8.6    7.96  )-----------( 186      ( 2022 02:03 )             25.7         142  |  109<H>  |  24<H>  ----------------------------<  119<H>  4.5   |  22  |  1.09    Ca    8.0<L>      2022 02:03  Phos  3.2       Mg     2.7         TPro  5.4<L>  /  Alb  3.6  /  TBili  0.8  /  DBili  x   /  AST  32  /  ALT  23  /  AlkPhos  40      PT/INR - ( 2022 02:03 )   PT: 13.3 sec;   INR: 1.11 ratio         PTT - ( 2022 02:03 )  PTT:26.2 sec    Creatinine, Serum: 1.09 mg/dL (22 @ 02:03)  Creatinine, Serum: 1.10 mg/dL (22 @ 14:29)  Creatinine, Serum: 1.12 mg/dL (22 @ 00:31)  Creatinine, Serum: 1.23 mg/dL (22 @ 13:51)  Creatinine, Serum: 1.38 mg/dL (22 @ 09:56)        ASSESSMENT/PLAN: 	              
CARDIOLOGY CONSULT - Dr. Masters         HPI:  76 yr old male with PMH of HTN, HLD, Type 2 DM, CAD, former smoker presented to Dr. Augusto Cordero for follow up and underwent stress test which showed inferior, inferolateral, and septal ischemia, EF 60%. Patient presents today for further ischemic workup. Denies any chest pain or shortness of breath today.  Of note, patient recently lost wife to pancreatic cancer and son to COVID.  (05 Jan 2022 09:28)  ROS otherwise neg       PAST MEDICAL & SURGICAL HISTORY:  HTN (hypertension)    Diabetes    Hyperlipidemia    CAD (coronary artery disease)    History of tonsillectomy            PREVIOUS DIAGNOSTIC TESTING:    [ ] Echocardiogram:  [ ]  Catheterization:   [ ] Stress Test:  	    MEDICATIONS:  Home Medications:  Aspirin Enteric Coated 81 mg oral delayed release tablet: 1 tab(s) orally once a day (05 Jan 2022 11:36)  atorvastatin 40 mg oral tablet: 1 tab(s) orally once a day (05 Jan 2022 11:36)  glimepiride: patient unsure of dose (05 Jan 2022 11:36)  metFORMIN 1000 mg oral tablet: 1 tab(s) orally 2 times a day (05 Jan 2022 11:36)  olmesartan-hydrochlorothiazide 40 mg-25 mg oral tablet: 1 tab(s) orally once a day (05 Jan 2022 11:36)  Trulicity Pen 0.75 mg/0.5 mL subcutaneous solution: once a week on Saturday (05 Jan 2022 11:36)  Vitamin B-12: 3000 milligram(s) orally once a day (05 Jan 2022 11:36)  Vitamin D3 50 mcg (2000 intl units) oral capsule: 1 cap(s) orally once a day (05 Jan 2022 11:36)      MEDICATIONS  (STANDING):  sodium chloride 0.9% Bolus 250 milliLiter(s) IV Bolus once      FAMILY HISTORY:      SOCIAL HISTORY:    [x ] Non-smoker - former smoker   [ ] Smoker  [x ] Alcohol    Allergies    No Known Allergies    Intolerances    	    REVIEW OF SYSTEMS:  CONSTITUTIONAL: No fever, weight loss, or fatigue  EYES: No eye pain, visual disturbances, or discharge  ENMT:  No difficulty hearing, tinnitus, vertigo; No sinus or throat pain  NECK: No pain or stiffness  RESPIRATORY: No cough, wheezing, chills or hemoptysis; No Shortness of Breath  CARDIOVASCULAR: No chest pain, palpitations, passing out, dizziness, or leg swelling  GASTROINTESTINAL: No abdominal or epigastric pain. No nausea, vomiting, or hematemesis; No diarrhea or constipation. No melena or hematochezia.  GENITOURINARY: No dysuria, frequency, hematuria, or incontinence  NEUROLOGICAL: No headaches, memory loss, loss of strength, numbness, or tremors  SKIN: No itching, burning, rashes, or lesions   	    [ x] All others negative see hpi 	  [ ] Unable to obtain    PHYSICAL EXAM:  T(C): 36.7 (01-05-22 @ 11:45), Max: 36.7 (01-05-22 @ 09:25)  HR: 97 (01-05-22 @ 12:15) (95 - 110)  BP: 155/81 (01-05-22 @ 09:25) (155/81 - 155/81)  RR: 16 (01-05-22 @ 12:15) (16 - 16)  SpO2: 96% (01-05-22 @ 12:15) (93% - 98%)  Wt(kg): --  I&O's Summary      Appearance: Normal	  Psychiatry: A & O x 3, Mood & affect appropriate  HEENT:   Normal oral mucosa, PERRL, EOMI	  Lymphatic: No lymphadenopathy  Cardiovascular: Normal S1 S2,RRR, No JVD, No murmurs  Respiratory: Lungs clear to auscultation	  Gastrointestinal:  Soft, Non-tender, + BS	  Skin: No rashes, No ecchymoses, No cyanosis	  Neurologic: Non-focal  Extremities: Normal range of motion, No clubbing, cyanosis or edema  Vascular: Peripheral pulses palpable 2+ bilaterally    TELEMETRY: 	    ECG:  	- no acute ischemic changes   RADIOLOGY:  OTHER: 	  	  LABS:	 	    CARDIAC MARKERS:                                  14.1   7.30  )-----------( 295      ( 05 Jan 2022 09:56 )             43.5     01-05    141  |  103  |  39<H>  ----------------------------<  163<H>  4.4   |  25  |  1.38<H>    Ca    10.5      05 Jan 2022 09:56        proBNP:   Lipid Profile: Pending  HgA1c: Pending  TSH: Pending      
      HPI:  76 yr old male with PMH of HTN, HLD, Type 2 DM, CAD, former smoker presented to Dr. Augusto Cordero for follow up and underwent stress test which showed inferior, inferolateral, and septal ischemia, EF 60%. Patient presents today for further ischemic workup. Denies any chest pain or shortness of breath today.  Of note, patient recently lost wife to pancreatic cancer and son to COVID.  (05 Jan 2022 09:28)  Patient has history of diabetes, was on oral meds at home,  s/p surgery, recent hypoglycemic episodes, no polyuria polydipsia. Patient follows up with [facility] for diabetes management.    PAST MEDICAL & SURGICAL HISTORY:  HTN (hypertension)    Diabetes    Hyperlipidemia    CAD (coronary artery disease)    History of tonsillectomy        FAMILY HISTORY:      Social History:    Outpatient Medications:    MEDICATIONS  (STANDING):  aMIOdarone    Tablet 400 milliGRAM(s) Oral every 8 hours  aMIOdarone    Tablet   Oral   aspirin enteric coated 325 milliGRAM(s) Oral daily  atorvastatin 80 milliGRAM(s) Oral at bedtime  bisacodyl Suppository 10 milliGRAM(s) Rectal once  dextrose 40% Gel 15 Gram(s) Oral once  dextrose 5%. 1000 milliLiter(s) (50 mL/Hr) IV Continuous <Continuous>  dextrose 50% Injectable 50 milliLiter(s) IV Push every 15 minutes  enoxaparin Injectable 40 milliGRAM(s) SubCutaneous daily  glucagon  Injectable 1 milliGRAM(s) IntraMuscular once  insulin glargine Injectable (LANTUS) 24 Unit(s) SubCutaneous at bedtime  insulin lispro (ADMELOG) corrective regimen sliding scale   SubCutaneous at bedtime  insulin lispro (ADMELOG) corrective regimen sliding scale   SubCutaneous three times a day before meals  insulin lispro Injectable (ADMELOG) 8 Unit(s) SubCutaneous three times a day before meals  metoprolol tartrate 50 milliGRAM(s) Oral every 8 hours  multivitamin 1 Tablet(s) Oral daily  pantoprazole    Tablet 40 milliGRAM(s) Oral before breakfast  polyethylene glycol 3350 17 Gram(s) Oral daily  senna 2 Tablet(s) Oral at bedtime  sodium chloride 0.9% lock flush 3 milliLiter(s) IV Push every 8 hours    MEDICATIONS  (PRN):  acetaminophen     Tablet .. 650 milliGRAM(s) Oral every 6 hours PRN Mild Pain (1 - 3)  oxyCODONE    IR 5 milliGRAM(s) Oral every 4 hours PRN Moderate Pain (4 - 6)  oxyCODONE    IR 10 milliGRAM(s) Oral every 4 hours PRN Severe Pain (7 - 10)      Allergies    No Known Allergies    Intolerances      Review of Systems:  Constitutional: No fever, no chills  Eyes: No blurry vision  Neuro: No tremors  HEENT: No pain, no neck swelling  Cardiovascular: No chest pain, no palpitations  Respiratory: No SOB, no cough  GI: No nausea, vomiting, abdominal pain  : No dysuria  Skin: no rash  MSK: Has leg swelling.  Psych: no depression  Endocrine: no polyuria, polydipsia    UNABLE TO OBTAIN    ALL OTHER SYSTEMS REVIEWED AND NEGATIVE        PHYSICAL EXAM:  VITALS: T(C): 36.8 (01-11-22 @ 04:02)  T(F): 98.2 (01-11-22 @ 04:02), Max: 98.2 (01-11-22 @ 04:02)  HR: 72 (01-11-22 @ 04:02) (72 - 86)  BP: 121/68 (01-11-22 @ 04:02) (106/68 - 121/68)  RR:  (18 - 18)  SpO2:  (94% - 95%)  Wt(kg): --  GENERAL: NAD, well-groomed, well-developed  EYES: No proptosis, no lid lag  HEENT:  Atraumatic, Normocephalic  THYROID: Normal size, no palpable nodules  RESPIRATORY: Clear to auscultation bilaterally; No rales, rhonchi, wheezing  CARDIOVASCULAR: Si S2, No murmurs;  GI: Soft, non distended, normal bowel sounds  SKIN: Dry, intact, No rashes or lesions  MUSCULOSKELETAL: Has BL lower extremity edema.  NEURO:  no tremor, sensation decreased in feet BL,    POCT Blood Glucose.: 98 mg/dL (01-11-22 @ 11:37)  POCT Blood Glucose.: 109 mg/dL (01-11-22 @ 07:42)  POCT Blood Glucose.: 117 mg/dL (01-10-22 @ 22:07)  POCT Blood Glucose.: 146 mg/dL (01-10-22 @ 17:34)  POCT Blood Glucose.: 120 mg/dL (01-10-22 @ 11:24)  POCT Blood Glucose.: 144 mg/dL (01-10-22 @ 07:31)  POCT Blood Glucose.: 145 mg/dL (01-09-22 @ 21:19)  POCT Blood Glucose.: 117 mg/dL (01-09-22 @ 16:40)  POCT Blood Glucose.: 121 mg/dL (01-09-22 @ 12:18)  POCT Blood Glucose.: 162 mg/dL (01-09-22 @ 06:54)  POCT Blood Glucose.: 123 mg/dL (01-09-22 @ 05:07)  POCT Blood Glucose.: 143 mg/dL (01-09-22 @ 02:43)  POCT Blood Glucose.: 127 mg/dL (01-09-22 @ 00:46)  POCT Blood Glucose.: 128 mg/dL (01-08-22 @ 22:33)  POCT Blood Glucose.: 89 mg/dL (01-08-22 @ 22:02)  POCT Blood Glucose.: 84 mg/dL (01-08-22 @ 21:47)  POCT Blood Glucose.: 96 mg/dL (01-08-22 @ 21:15)  POCT Blood Glucose.: 104 mg/dL (01-08-22 @ 20:18)  POCT Blood Glucose.: 180 mg/dL (01-08-22 @ 18:58)  POCT Blood Glucose.: 207 mg/dL (01-08-22 @ 18:15)  POCT Blood Glucose.: 186 mg/dL (01-08-22 @ 17:00)  POCT Blood Glucose.: 150 mg/dL (01-08-22 @ 16:02)  POCT Blood Glucose.: 189 mg/dL (01-08-22 @ 15:00)  POCT Blood Glucose.: 152 mg/dL (01-08-22 @ 14:23)  POCT Blood Glucose.: 132 mg/dL (01-08-22 @ 13:37)  POCT Blood Glucose.: 133 mg/dL (01-08-22 @ 12:34)                            9.7    9.59  )-----------( 301      ( 11 Jan 2022 05:38 )             29.9       01-11    140  |  105  |  43<H>  ----------------------------<  103<H>  4.2   |  23  |  1.38<H>    EGFR if : 57<L>  EGFR if non : 49<L>    Ca    9.0      01-11  Mg     2.4     01-09  Phos  3.0     01-09    TPro  6.2  /  Alb  3.8  /  TBili  0.7  /  DBili  x   /  AST  20  /  ALT  27  /  AlkPhos  42  01-09      Thyroid Function Tests:  01-05 @ 21:27 TSH 0.85 FreeT4 -- T3 -- Anti TPO -- Anti Thyroglobulin Ab -- TSI --              Radiology:

## 2022-01-11 NOTE — PROGRESS NOTE ADULT - SUBJECTIVE AND OBJECTIVE BOX
CARDIOLOGY FOLLOW UP - Dr. Masters  Date of Service: 1/11/22  CC: eager to go home  no cp/sob     Review of Systems:  Constitutional: No fever, weight loss, or fatigue  Respiratory: No cough, wheezing, or hemoptysis, no shortness of breath  Cardiovascular: No chest pain, palpitations, passing out, dizziness, or leg swelling  Gastrointestinal: No abd or epigastric pain.  No nausea, vomiting, or hematemesis; no diarrhea or constipation, no melena or hematochezia  Vascular: no edema       PHYSICAL EXAM:  T(C): 36.8 (01-11-22 @ 04:02), Max: 36.8 (01-11-22 @ 04:02)  HR: 72 (01-11-22 @ 04:02) (72 - 86)  BP: 121/68 (01-11-22 @ 04:02) (106/68 - 121/68)  RR: 18 (01-11-22 @ 04:02) (18 - 18)  SpO2: 94% (01-11-22 @ 04:02) (94% - 95%)  Wt(kg): --  I&O's Summary    10 Rob 2022 07:01  -  11 Jan 2022 07:00  --------------------------------------------------------  IN: 930 mL / OUT: 1150 mL / NET: -220 mL    11 Jan 2022 07:01  -  11 Jan 2022 10:58  --------------------------------------------------------  IN: 360 mL / OUT: 0 mL / NET: 360 mL        Appearance: Normal	  Cardiovascular: Normal S1 S2,RRR, No JVD, No murmurs  Respiratory: Lungs clear to auscultation	  Gastrointestinal:  Soft, Non-tender, + BS	  Extremities: Normal range of motion, No clubbing, cyanosis or edema      Home Medications:  Aspirin Enteric Coated 81 mg oral delayed release tablet: 1 tab(s) orally once a day (05 Jan 2022 11:36)  atorvastatin 40 mg oral tablet: 1 tab(s) orally once a day (05 Jan 2022 11:36)  glimepiride: patient unsure of dose (05 Jan 2022 11:36)  metFORMIN 1000 mg oral tablet: 1 tab(s) orally 2 times a day (05 Jan 2022 11:36)  olmesartan-hydrochlorothiazide 40 mg-25 mg oral tablet: 1 tab(s) orally once a day (05 Jan 2022 11:36)  Trulicity Pen 0.75 mg/0.5 mL subcutaneous solution: once a week on Saturday (05 Jan 2022 11:36)  Vitamin B-12: 3000 milligram(s) orally once a day (05 Jan 2022 11:36)  Vitamin D3 50 mcg (2000 intl units) oral capsule: 1 cap(s) orally once a day (05 Jan 2022 11:36)      MEDICATIONS  (STANDING):  aMIOdarone    Tablet 400 milliGRAM(s) Oral every 8 hours  aMIOdarone    Tablet   Oral   aspirin enteric coated 325 milliGRAM(s) Oral daily  atorvastatin 80 milliGRAM(s) Oral at bedtime  bisacodyl Suppository 10 milliGRAM(s) Rectal once  dextrose 40% Gel 15 Gram(s) Oral once  dextrose 5%. 1000 milliLiter(s) (50 mL/Hr) IV Continuous <Continuous>  dextrose 50% Injectable 50 milliLiter(s) IV Push every 15 minutes  enoxaparin Injectable 40 milliGRAM(s) SubCutaneous daily  glucagon  Injectable 1 milliGRAM(s) IntraMuscular once  insulin glargine Injectable (LANTUS) 24 Unit(s) SubCutaneous at bedtime  insulin lispro (ADMELOG) corrective regimen sliding scale   SubCutaneous at bedtime  insulin lispro (ADMELOG) corrective regimen sliding scale   SubCutaneous three times a day before meals  insulin lispro Injectable (ADMELOG) 8 Unit(s) SubCutaneous three times a day before meals  metoprolol tartrate 50 milliGRAM(s) Oral every 8 hours  multivitamin 1 Tablet(s) Oral daily  pantoprazole    Tablet 40 milliGRAM(s) Oral before breakfast  polyethylene glycol 3350 17 Gram(s) Oral daily  potassium chloride    Tablet ER 40 milliEquivalent(s) Oral daily  senna 2 Tablet(s) Oral at bedtime  sodium chloride 0.9% lock flush 3 milliLiter(s) IV Push every 8 hours      TELEMETRY: 	NSR    ECG:  	  RADIOLOGY:   DIAGNOSTIC TESTING:  [ ] Echocardiogram:  [ ]  Catheterization:  [ ] Stress Test:    OTHER: 	    LABS:	 	    Troponin T, High Sensitivity Result: 337 ng/L [0 - 51] (01-07 @ 02:03)  Creatine Kinase, Serum: 358 U/L [30 - 200] (01-07 @ 02:03)  CKMB Units: 11.1 ng/mL [0.0 - 6.7] (01-07 @ 02:03)  Creatine Kinase, Serum: 290 U/L [30 - 200] (01-06 @ 14:29)  CKMB Units: 16.8 ng/mL [0.0 - 6.7] (01-06 @ 14:29)  Troponin T, High Sensitivity Result: 223 ng/L [0 - 51] (01-06 @ 14:29)                          9.7    9.59  )-----------( 301      ( 11 Jan 2022 05:38 )             29.9     01-11    140  |  105  |  43<H>  ----------------------------<  103<H>  4.2   |  23  |  1.38<H>    Ca    9.0      11 Jan 2022 05:38

## 2022-01-12 ENCOUNTER — NON-APPOINTMENT (OUTPATIENT)
Age: 77
End: 2022-01-12

## 2022-01-12 RX ORDER — AMIODARONE HYDROCHLORIDE 400 MG/1
1 TABLET ORAL
Qty: 30 | Refills: 0
Start: 2022-01-12 | End: 2022-02-10

## 2022-01-12 RX ORDER — METOPROLOL SUCCINATE 100 MG/1
100 TABLET, EXTENDED RELEASE ORAL TWICE DAILY
Refills: 0 | Status: ACTIVE | COMMUNITY

## 2022-01-12 RX ORDER — ASPIRIN 81 MG
81 TABLET, DELAYED RELEASE (ENTERIC COATED) ORAL
Refills: 0 | Status: ACTIVE | COMMUNITY

## 2022-01-12 RX ORDER — MENTHOL/CAMPHOR 0.5 %-0.5%
1000 LOTION (ML) TOPICAL
Refills: 0 | Status: ACTIVE | COMMUNITY

## 2022-01-12 RX ORDER — OMEGA-3/DHA/EPA/FISH OIL 35-113.5MG
1000 TABLET,CHEWABLE ORAL
Refills: 0 | Status: ACTIVE | COMMUNITY

## 2022-01-12 RX ORDER — ACETAMINOPHEN 325 MG/1
TABLET, FILM COATED ORAL
Refills: 0 | Status: ACTIVE | COMMUNITY

## 2022-01-12 RX ORDER — METFORMIN HYDROCHLORIDE 850 MG/1
1 TABLET ORAL
Qty: 0 | Refills: 0 | DISCHARGE
Start: 2022-01-12

## 2022-01-12 RX ORDER — AMIODARONE HYDROCHLORIDE 200 MG/1
200 TABLET ORAL
Refills: 0 | Status: DISCONTINUED | COMMUNITY
End: 2022-01-12

## 2022-01-12 RX ORDER — GLIMEPIRIDE 4 MG/1
TABLET ORAL
Refills: 0 | Status: ACTIVE | COMMUNITY

## 2022-01-12 RX ORDER — ATORVASTATIN CALCIUM 40 MG/1
40 TABLET, FILM COATED ORAL
Refills: 0 | Status: ACTIVE | COMMUNITY

## 2022-01-12 RX ORDER — METFORMIN HYDROCHLORIDE 1000 MG/1
1000 TABLET, COATED ORAL
Qty: 60 | Refills: 0 | Status: ACTIVE | COMMUNITY

## 2022-01-12 RX ORDER — DULAGLUTIDE 1.5 MG/.5ML
1.5 INJECTION, SOLUTION SUBCUTANEOUS
Refills: 0 | Status: ACTIVE | COMMUNITY

## 2022-01-12 RX ORDER — LORATADINE 5 MG
TABLET,CHEWABLE ORAL
Refills: 0 | Status: ACTIVE | COMMUNITY

## 2022-01-13 ENCOUNTER — APPOINTMENT (OUTPATIENT)
Dept: CARE COORDINATION | Facility: HOME HEALTH | Age: 77
End: 2022-01-13
Payer: MEDICARE

## 2022-01-13 VITALS
SYSTOLIC BLOOD PRESSURE: 130 MMHG | DIASTOLIC BLOOD PRESSURE: 80 MMHG | HEART RATE: 69 BPM | RESPIRATION RATE: 16 BRPM | OXYGEN SATURATION: 96 %

## 2022-01-13 PROCEDURE — 99024 POSTOP FOLLOW-UP VISIT: CPT

## 2022-01-13 RX ORDER — AMIODARONE HYDROCHLORIDE 200 MG/1
200 TABLET ORAL
Refills: 0 | Status: ACTIVE | COMMUNITY

## 2022-01-13 NOTE — PHYSICAL EXAM
[Sclera] : the sclera and conjunctiva were normal [PERRL With Normal Accommodation] : pupils were equal in size, round, and reactive to light [Neck Appearance] : the appearance of the neck was normal [Auscultation Breath Sounds / Voice Sounds] : lungs were clear to auscultation bilaterally [Heart Sounds] : normal S1 and S2 [Examination Of The Chest] : the chest was normal in appearance [1+] : left 1+ [Bowel Sounds] : normal bowel sounds [Abdomen Soft] : soft [No CVA Tenderness] : no ~M costovertebral angle tenderness [Abnormal Walk] : normal gait [Skin Color & Pigmentation] : normal skin color and pigmentation [FreeTextEntry1] : MTI-approximated, RT LE harvest site intact  [No Focal Deficits] : no focal deficits [Oriented To Time, Place, And Person] : oriented to person, place, and time

## 2022-01-13 NOTE — ASSESSMENT
[FreeTextEntry1] : 77 y/o former smoker M with PMHx of HTN, HLD, Type 2 DM, and CAD initially presented s/p abnormal stress test.\par On 1/6 s/p C3L w/ Dr Mulligan\par  pre-op R femoral IABP.

## 2022-01-13 NOTE — HISTORY OF PRESENT ILLNESS
[FreeTextEntry1] : FOLLOW YOUR HEART HOME VISIT-[facility]\par Home Visit made Sherry MILLER ] . A  patient of Dr Pamela Mulligan   S/P [id number]   on 1/6/22. Discharged from [facility]\par \par \par Visiting patient for post discharge transitional care management and post surgical follow up. \par COVID Screening performed over the phone prior to my entry into the home.\par Arrived to  home, he appears well.  Initially alone but soon after accompanied by homecare RN.\par \par Denies complaints or concerns at this time\par \par \par

## 2022-01-14 NOTE — CHART NOTE - NSCHARTNOTEFT_GEN_A_CORE
This patient underwent a CABG x 3 on 1/6/22 and required post-operative vasopressors and IVF resuscitation secondary to post-operative vasogenic and hypovolemic shock.

## 2022-01-18 ENCOUNTER — NON-APPOINTMENT (OUTPATIENT)
Age: 77
End: 2022-01-18

## 2022-01-18 DIAGNOSIS — I25.10 ATHEROSCLEROTIC HEART DISEASE OF NATIVE CORONARY ARTERY W/OUT ANGINA PECTORIS: ICD-10-CM

## 2022-01-19 ENCOUNTER — APPOINTMENT (OUTPATIENT)
Dept: CARDIOTHORACIC SURGERY | Facility: CLINIC | Age: 77
End: 2022-01-19
Payer: MEDICARE

## 2022-01-21 ENCOUNTER — APPOINTMENT (OUTPATIENT)
Dept: CARDIOTHORACIC SURGERY | Facility: CLINIC | Age: 77
End: 2022-01-21
Payer: MEDICARE

## 2022-01-21 VITALS
OXYGEN SATURATION: 97 % | HEIGHT: 72 IN | SYSTOLIC BLOOD PRESSURE: 169 MMHG | DIASTOLIC BLOOD PRESSURE: 89 MMHG | BODY MASS INDEX: 26.82 KG/M2 | HEART RATE: 70 BPM | WEIGHT: 198 LBS | RESPIRATION RATE: 16 BRPM

## 2022-01-21 VITALS — DIASTOLIC BLOOD PRESSURE: 79 MMHG | TEMPERATURE: 98.1 F | SYSTOLIC BLOOD PRESSURE: 172 MMHG

## 2022-01-21 DIAGNOSIS — Z95.1 PRESENCE OF AORTOCORONARY BYPASS GRAFT: ICD-10-CM

## 2022-01-21 DIAGNOSIS — E78.5 HYPERLIPIDEMIA, UNSPECIFIED: ICD-10-CM

## 2022-01-21 PROCEDURE — 97802 MEDICAL NUTRITION INDIV IN: CPT

## 2022-01-21 RX ORDER — OXYCODONE 5 MG/1
5 TABLET ORAL
Refills: 0 | Status: COMPLETED | COMMUNITY
End: 2022-01-21

## 2022-02-09 ENCOUNTER — TRANSCRIPTION ENCOUNTER (OUTPATIENT)
Age: 77
End: 2022-02-09

## 2022-06-28 ENCOUNTER — NON-APPOINTMENT (OUTPATIENT)
Age: 77
End: 2022-06-28

## 2022-11-04 NOTE — PROGRESS NOTE ADULT - TIME-BASED BILLING (NON-CRITICAL CARE)
We are committed to providing you the best care possible. If you receive a survey after visiting one of our offices, please take time to share your experience concerning your physician office visit. These surveys are confidential and no health information about you is shared. We are eager to improve for you and we are counting on your feedback to help make that happen.
Time-based billing (NON-critical care)

## 2025-05-13 NOTE — DISCHARGE NOTE PROVIDER - NSDCFUSCHEDAPPT_GEN_ALL_CORE_FT
Medication teaching and assessment/Wound care and assessment VERO MILLER ; 01/19/2022 ; [facility] CT Surg 300 Comm

## (undated) DEVICE — DRSG OPSITE 2.5 X 2"

## (undated) DEVICE — POSITIONER CARDIAC BUMP

## (undated) DEVICE — SUT PROLENE 7-0 4-24" BV-1

## (undated) DEVICE — CONNECTOR STRAIGHT 3/8 X 1/2"

## (undated) DEVICE — VESSEL LOOP ASPEN MAXI RED

## (undated) DEVICE — SUT POLYSORB 2 30" GS-26

## (undated) DEVICE — TUBING ATS SUCTION LINE

## (undated) DEVICE — SYNOVIS VASCULAR PROBE 1.5MM 15CM

## (undated) DEVICE — DRSG OPSITE 13.75 X 4"

## (undated) DEVICE — GOWN XXXL

## (undated) DEVICE — DRSG STOCKINETTE IMPERVIOUS XL

## (undated) DEVICE — BEAVER BLADE MINI SHARP ALL ROUND (BLUE)

## (undated) DEVICE — GOWN TRIMAX LG

## (undated) DEVICE — ELCTR BOVIE TIP BLADE VALLEYLAB 6.5"

## (undated) DEVICE — CONNECTOR "Y" 1/2 X 3/8 X 3/8"

## (undated) DEVICE — DRSG KLING 6"

## (undated) DEVICE — DRAPE INSTRUMENT POUCH 6.75" X 11"

## (undated) DEVICE — SUT DOUBLE 6 WIRE STERNAL

## (undated) DEVICE — GLV 8 PROTEXIS ORTHO (CREAM)

## (undated) DEVICE — DRSG ACE BANDAGE 6"

## (undated) DEVICE — SUT PROLENE 3-0 36" SH

## (undated) DEVICE — DRAPE MAYO STAND 30"

## (undated) DEVICE — MEDICATION LABELS W MARKER

## (undated) DEVICE — SUT POLYSORB 2-0 30" GS-21 UNDYED

## (undated) DEVICE — FILTER REINFUSION FOR SALVAGED BLOOD DISP

## (undated) DEVICE — SUT PROLENE 6-0 4-30" C-1

## (undated) DEVICE — NDL BLUNT 18G LOOP VESSEL MAXI WHITE

## (undated) DEVICE — AORTIC PUNCH 4.0MM LONG LENGTH HANDLE

## (undated) DEVICE — DRAPE LIGHT HANDLE COVER (BLUE)

## (undated) DEVICE — SUT TICRON 5 30" KV-40

## (undated) DEVICE — SOL IRR POUR NS 0.9% 500ML

## (undated) DEVICE — POSITIONER FOAM EGG CRATE ULNAR 2PCS (PINK)

## (undated) DEVICE — PACING CABLE (BROWN) A/V TEMP SCREW DOWN 12FT

## (undated) DEVICE — GLV 8 PROTEXIS (WHITE)

## (undated) DEVICE — BLADE SCALPEL SAFETYLOCK #15

## (undated) DEVICE — FEEDING TUBE NG SUMP 16FR 48"

## (undated) DEVICE — PREP CHLORAPREP HI-LITE ORANGE 26ML

## (undated) DEVICE — WARMING BLANKET FULL ADULT

## (undated) DEVICE — SUT PROLENE 4-0 36" RB-1

## (undated) DEVICE — SUT BLUNT SZ 5

## (undated) DEVICE — AORTIC PUNCH 4.0MM STANDARD HANDLE

## (undated) DEVICE — DRAPE 3/4 SHEET W REINFORCEMENT 56X77"

## (undated) DEVICE — GETINGE VASOVIEW 7 ENDOSCOPIC VESSEL HARVESTING SYSTEM

## (undated) DEVICE — SUT PROLENE 4-0 36" BB

## (undated) DEVICE — STEALTH CLAMP INSERT FIBRA/FIBRA 90MM

## (undated) DEVICE — DRAIN RESERVOIR FOR JACKSON PRATT 100CC CARDINAL

## (undated) DEVICE — SUCTION CATH ARGYLE WHISTLE TIP 14FR STRAIGHT PACKED

## (undated) DEVICE — SYS VEIN HARVESTING VIRTUOSAPH PLUS W/ RADIAL

## (undated) DEVICE — DRAPE TOWEL BLUE 17" X 24"

## (undated) DEVICE — GLV 8.5 PROTEXIS (WHITE)

## (undated) DEVICE — PACK CUSTOM W/INSPIRE OXYGENATOR

## (undated) DEVICE — DRAIN CHANNEL 19FR ROUND FULL FLUTED

## (undated) DEVICE — GLV 7.5 PROTEXIS (WHITE)

## (undated) DEVICE — DRSG TEGADERM 6"X8"

## (undated) DEVICE — DRAPE IOBAN 23" X 23"

## (undated) DEVICE — TUBING KIT FAST START ATF 40

## (undated) DEVICE — CONNECTOR INTERSEPT "Y" 1/4 X 1/4 X 1/4"

## (undated) DEVICE — SOL NORMOSOL-R PH7.4 1000ML

## (undated) DEVICE — MARKING PEN W RULER

## (undated) DEVICE — DRSG STERISTRIPS 0.5 X 4"

## (undated) DEVICE — STAPLER SKIN VISI-STAT 35 WIDE

## (undated) DEVICE — NDL COUNTER FOAM AND MAGNET 40-70

## (undated) DEVICE — VENODYNE/SCD SLEEVE CALF MEDIUM

## (undated) DEVICE — WARMING BLANKET UPPER ADULT

## (undated) DEVICE — SOL IRR POUR H2O 250ML

## (undated) DEVICE — TUBING TUR 2 PRONG

## (undated) DEVICE — SUT BIOSYN 4-0 18" P-12

## (undated) DEVICE — SUT PROLENE 8-0 24" BV175-6

## (undated) DEVICE — SOL IRR BAG NS 0.9% 3000ML

## (undated) DEVICE — TUBING IV SET MICROCLAVE ADAPTER

## (undated) DEVICE — SUT SOFSILK 0 30" V-20

## (undated) DEVICE — SYR ASEPTO

## (undated) DEVICE — NDL HYPO SAFE 18G X 1.5" (PINK)

## (undated) DEVICE — DRAIN CHANNEL 32FR ROUND HUBLESS FULL FLUTED

## (undated) DEVICE — STEALTH CLAMP INSERT FIBRA/FIBRA 60MM

## (undated) DEVICE — GLV 7 PROTEXIS (WHITE)

## (undated) DEVICE — TOURNIQUET SET 12FR (1 RED, 1 BLUE, 1 SNARE) 7"

## (undated) DEVICE — TUBING INSUFFLATION LAP FILTER 10FT

## (undated) DEVICE — GOWN TRIMAX XXL

## (undated) DEVICE — SUT PLEDGET PRE PUNCH 4.8 X 9.5 X 1.5 MM

## (undated) DEVICE — VENTING ADAPTER "Y" (RED/BLUE) 7.5"

## (undated) DEVICE — SUT STAINLESS STEEL 5 18" SCC

## (undated) DEVICE — PHRENIC NERVE PAD MEDIUM

## (undated) DEVICE — SUT POLYSORB 0 36" GS-25 UNDYED

## (undated) DEVICE — TUBING SUCTION 20FT

## (undated) DEVICE — SUT POLYSORB 4-0 30" CVF-23

## (undated) DEVICE — SYR LUER LOK 50CC

## (undated) DEVICE — SPECIMEN CONTAINER 100ML

## (undated) DEVICE — Device

## (undated) DEVICE — SYR LUER LOK 20CC

## (undated) DEVICE — DRAIN PLEUROVAC

## (undated) DEVICE — MULTIPLE PERFUSION SET FEMALE 1 INLET LEG W 4 LEGS 15" (BLUE/RED)

## (undated) DEVICE — SENSOR MYOCARDIAL TEMP 15MM

## (undated) DEVICE — SUMP PERICARDIAL 20FR 1/4" ADULT

## (undated) DEVICE — STRYKER INTERPULSE HANDPIECE W IRR SUCTION TUBE

## (undated) DEVICE — VISITEC 4X4

## (undated) DEVICE — SYR LUER LOK 3CC

## (undated) DEVICE — SUT SURGICAL STEEL 6 30" BP-1

## (undated) DEVICE — SOL ANTI FOG

## (undated) DEVICE — SUT SOFSILK 2 60" TIES

## (undated) DEVICE — PACK UNIVERSAL CARDIAC

## (undated) DEVICE — WARMING BLANKET DUO-THERM HYPER/HYPOTHERM ADULT

## (undated) DEVICE — DRSG DERMABOND PRINEO 60CM

## (undated) DEVICE — GLV 6.5 PROTEXIS (WHITE)

## (undated) DEVICE — LAP PAD 18 X 18"

## (undated) DEVICE — DRAPE SLUSH / WARMER 44 X 66"

## (undated) DEVICE — SUCTION YANKAUER NO CONTROL VENT

## (undated) DEVICE — SYR LUER LOK 30CC

## (undated) DEVICE — AORTIC PUNCH 5MM STANDARD HANDLE

## (undated) DEVICE — FOLEY TRAY 16FR 5CC LF LUBRISIL ADVANCE TEMP CLOSED

## (undated) DEVICE — BLADE SCALPEL SAFETYLOCK #11

## (undated) DEVICE — PACK UNIVERSAL CARDIAC SUPPLEMNTAL B

## (undated) DEVICE — BULLDOG SPRING CLIP 6MM SOFT/SOFT

## (undated) DEVICE — TUBING TRUWAVE PRESSURE MALE/FEMALE 72"

## (undated) DEVICE — BLADE SCALPEL SAFETYLOCK #10

## (undated) DEVICE — DRAPE 1/2 SHEET 40X57"

## (undated) DEVICE — HEMOCONCENTRATOR PERFUSION ID TUBING 1/4"

## (undated) DEVICE — SAW BLADE MICROAIRE STERNUM 1X34X9.4MM

## (undated) DEVICE — SUT PROLENE 7-0 24" BV175-6